# Patient Record
Sex: FEMALE | Race: BLACK OR AFRICAN AMERICAN | NOT HISPANIC OR LATINO | URBAN - METROPOLITAN AREA
[De-identification: names, ages, dates, MRNs, and addresses within clinical notes are randomized per-mention and may not be internally consistent; named-entity substitution may affect disease eponyms.]

---

## 2019-10-09 ENCOUNTER — INPATIENT (INPATIENT)
Facility: HOSPITAL | Age: 59
LOS: 11 days | Discharge: NURSING, REHAB OR LONG-TERM CARE | DRG: 54 | End: 2019-10-21
Attending: NEUROLOGICAL SURGERY | Admitting: NEUROLOGICAL SURGERY
Payer: COMMERCIAL

## 2019-10-09 VITALS
DIASTOLIC BLOOD PRESSURE: 80 MMHG | SYSTOLIC BLOOD PRESSURE: 156 MMHG | OXYGEN SATURATION: 93 % | HEART RATE: 123 BPM | RESPIRATION RATE: 22 BRPM | TEMPERATURE: 100 F

## 2019-10-09 LAB
ALBUMIN SERPL ELPH-MCNC: 4 G/DL — SIGNIFICANT CHANGE UP (ref 3.3–5)
ALP SERPL-CCNC: 69 U/L — SIGNIFICANT CHANGE UP (ref 40–120)
ALT FLD-CCNC: 12 U/L — SIGNIFICANT CHANGE UP (ref 10–45)
ANION GAP SERPL CALC-SCNC: 12 MMOL/L — SIGNIFICANT CHANGE UP (ref 5–17)
AST SERPL-CCNC: 11 U/L — SIGNIFICANT CHANGE UP (ref 10–40)
BASOPHILS # BLD AUTO: 0.02 K/UL — SIGNIFICANT CHANGE UP (ref 0–0.2)
BASOPHILS NFR BLD AUTO: 0.2 % — SIGNIFICANT CHANGE UP (ref 0–2)
BILIRUB SERPL-MCNC: 0.5 MG/DL — SIGNIFICANT CHANGE UP (ref 0.2–1.2)
BUN SERPL-MCNC: 8 MG/DL — SIGNIFICANT CHANGE UP (ref 7–23)
CALCIUM SERPL-MCNC: 8.9 MG/DL — SIGNIFICANT CHANGE UP (ref 8.4–10.5)
CHLORIDE SERPL-SCNC: 109 MMOL/L — HIGH (ref 96–108)
CO2 SERPL-SCNC: 23 MMOL/L — SIGNIFICANT CHANGE UP (ref 22–31)
CREAT SERPL-MCNC: 0.67 MG/DL — SIGNIFICANT CHANGE UP (ref 0.5–1.3)
EOSINOPHIL # BLD AUTO: 0.01 K/UL — SIGNIFICANT CHANGE UP (ref 0–0.5)
EOSINOPHIL NFR BLD AUTO: 0.1 % — SIGNIFICANT CHANGE UP (ref 0–6)
GAS PNL BLDV: SIGNIFICANT CHANGE UP
GLUCOSE SERPL-MCNC: 195 MG/DL — HIGH (ref 70–99)
HCT VFR BLD CALC: 47.7 % — HIGH (ref 34.5–45)
HGB BLD-MCNC: 16.6 G/DL — HIGH (ref 11.5–15.5)
IMM GRANULOCYTES NFR BLD AUTO: 0.4 % — SIGNIFICANT CHANGE UP (ref 0–1.5)
LYMPHOCYTES # BLD AUTO: 0.94 K/UL — LOW (ref 1–3.3)
LYMPHOCYTES # BLD AUTO: 11.2 % — LOW (ref 13–44)
MCHC RBC-ENTMCNC: 30.3 PG — SIGNIFICANT CHANGE UP (ref 27–34)
MCHC RBC-ENTMCNC: 34.8 GM/DL — SIGNIFICANT CHANGE UP (ref 32–36)
MCV RBC AUTO: 87.2 FL — SIGNIFICANT CHANGE UP (ref 80–100)
MONOCYTES # BLD AUTO: 0.32 K/UL — SIGNIFICANT CHANGE UP (ref 0–0.9)
MONOCYTES NFR BLD AUTO: 3.8 % — SIGNIFICANT CHANGE UP (ref 2–14)
NEUTROPHILS # BLD AUTO: 7.04 K/UL — SIGNIFICANT CHANGE UP (ref 1.8–7.4)
NEUTROPHILS NFR BLD AUTO: 84.3 % — HIGH (ref 43–77)
NRBC # BLD: 0 /100 WBCS — SIGNIFICANT CHANGE UP (ref 0–0)
PLATELET # BLD AUTO: 203 K/UL — SIGNIFICANT CHANGE UP (ref 150–400)
POTASSIUM SERPL-MCNC: 4.5 MMOL/L — SIGNIFICANT CHANGE UP (ref 3.5–5.3)
POTASSIUM SERPL-SCNC: 4.5 MMOL/L — SIGNIFICANT CHANGE UP (ref 3.5–5.3)
PROT SERPL-MCNC: 7.3 G/DL — SIGNIFICANT CHANGE UP (ref 6–8.3)
RBC # BLD: 5.47 M/UL — HIGH (ref 3.8–5.2)
RBC # FLD: 13.2 % — SIGNIFICANT CHANGE UP (ref 10.3–14.5)
SODIUM SERPL-SCNC: 144 MMOL/L — SIGNIFICANT CHANGE UP (ref 135–145)
WBC # BLD: 8.36 K/UL — SIGNIFICANT CHANGE UP (ref 3.8–10.5)
WBC # FLD AUTO: 8.36 K/UL — SIGNIFICANT CHANGE UP (ref 3.8–10.5)

## 2019-10-09 PROCEDURE — 93010 ELECTROCARDIOGRAM REPORT: CPT

## 2019-10-09 PROCEDURE — 99291 CRITICAL CARE FIRST HOUR: CPT

## 2019-10-09 PROCEDURE — 71045 X-RAY EXAM CHEST 1 VIEW: CPT | Mod: 26

## 2019-10-09 RX ORDER — PIPERACILLIN AND TAZOBACTAM 4; .5 G/20ML; G/20ML
3.38 INJECTION, POWDER, LYOPHILIZED, FOR SOLUTION INTRAVENOUS ONCE
Refills: 0 | Status: COMPLETED | OUTPATIENT
Start: 2019-10-09 | End: 2019-10-09

## 2019-10-09 RX ORDER — SODIUM CHLORIDE 9 MG/ML
2000 INJECTION, SOLUTION INTRAVENOUS ONCE
Refills: 0 | Status: COMPLETED | OUTPATIENT
Start: 2019-10-09 | End: 2019-10-09

## 2019-10-09 RX ORDER — ACETAMINOPHEN 500 MG
650 TABLET ORAL ONCE
Refills: 0 | Status: COMPLETED | OUTPATIENT
Start: 2019-10-09 | End: 2019-10-09

## 2019-10-09 RX ORDER — VANCOMYCIN HCL 1 G
1000 VIAL (EA) INTRAVENOUS ONCE
Refills: 0 | Status: COMPLETED | OUTPATIENT
Start: 2019-10-09 | End: 2019-10-09

## 2019-10-09 RX ADMIN — PIPERACILLIN AND TAZOBACTAM 200 GRAM(S): 4; .5 INJECTION, POWDER, LYOPHILIZED, FOR SOLUTION INTRAVENOUS at 23:11

## 2019-10-09 RX ADMIN — SODIUM CHLORIDE 2000 MILLILITER(S): 9 INJECTION, SOLUTION INTRAVENOUS at 23:20

## 2019-10-09 RX ADMIN — PIPERACILLIN AND TAZOBACTAM 3.38 GRAM(S): 4; .5 INJECTION, POWDER, LYOPHILIZED, FOR SOLUTION INTRAVENOUS at 23:51

## 2019-10-09 RX ADMIN — Medication 650 MILLIGRAM(S): at 23:11

## 2019-10-09 RX ADMIN — Medication 250 MILLIGRAM(S): at 23:51

## 2019-10-09 NOTE — ED ADULT NURSE NOTE - NSIMPLEMENTINTERV_GEN_ALL_ED
Implemented All Fall Risk Interventions:  [place] to call system. Call bell, personal items and telephone within reach. Instruct patient to call for assistance. Room bathroom lighting operational. Non-slip footwear when patient is off stretcher. Physically safe environment: no spills, clutter or unnecessary equipment. Stretcher in lowest position, wheels locked, appropriate side rails in place. Provide visual cue, wrist band, yellow gown, etc. Monitor gait and stability. Monitor for mental status changes and reorient to person, place, and time. Review medications for side effects contributing to fall risk. Reinforce activity limits and safety measures with patient and family.

## 2019-10-09 NOTE — ED PROVIDER NOTE - OBJECTIVE STATEMENT
59 Y F transferred from [place] for CT showing shift and vasogenic edema, and meningioma. Per tx paperwork pt is from NH has hx of CVA and HTN was sent in to OSH today for AMS. Per documentation unclear what her baseline MS is. At OSH pt had CTH done that showed exta-axial masses, likely meningiomas, there is mass effect on the left ventricle with 1.3 cm left to right midline shift. She was given Keppra 100 mh, decadron 12mg, was agitated, got haldol 5mg and plan was to do MRI however tx here for neurosurgery. Remainder of hx limited 2/2 patients MS and no family here at the moment.

## 2019-10-09 NOTE — ED ADULT NURSE NOTE - OBJECTIVE STATEMENT
60 YO female PMHx DM, HTN, HLD, transfer from [facility] for AMS and possible meningioma with 3mm shift. Patient lethargic as patient received 5 of haldol from transferring facility. As per EMS, patient was initially brought to [facility] ED for AMS. Head CT revealed possible mass with shift, transferred for neuro consult. As per EMS, patient was combative at [facility], given haldol and keppra for agitation. Patient currently A&OX2, disorientated to time, PERRL, equal strength and senstion b/l. denies chest pain, SOB, HA, N/V/D, abdominal pain, fever/chills, urinary symptoms, hematuria, weakness, dizziness, numbness, tinging. Peripheral pulses present b/l. Skin warm, dry and pink. Pt placed in position of comfort. Pt educated on call bell system and provided call bell. Bed in lowest position, wheels locked, appropriate side rails raised. Pt denies needs at this time.

## 2019-10-09 NOTE — ED PROVIDER NOTE - ATTENDING CONTRIBUTION TO CARE
Agree with above, Fredy Carballo MD, FACEP   Based on patient's history and physical exam, as well as the results of today's workup, I feel that patient warrants admission to the hospital for further workup/evaluation and continued management. I discussed the findings of today's workup with the patient and addressed the patient's questions and concerns. The patient was agreeable with admission. Our team spoke with the inpatient receiving team who accepted the patient for admission and subsequently took over the patient's care at the time of admission.

## 2019-10-09 NOTE — ED PROVIDER NOTE - CLINICAL SUMMARY MEDICAL DECISION MAKING FREE TEXT BOX
59 Y F went to OSH for ? AMS and was found to have vasogenic edema on CT scan with some midline shift, found to be febrile and tachycardic here. Will get full sepsis workup, neurosurgery to see pt. At this time she is protecting her airway and coughing feel this could be pneumonia however urine also appears infected, will send both. Pt will need admission posible further imaging her NSX and chest AP if no source on initial workup.

## 2019-10-10 DIAGNOSIS — R09.89 OTHER SPECIFIED SYMPTOMS AND SIGNS INVOLVING THE CIRCULATORY AND RESPIRATORY SYSTEMS: ICD-10-CM

## 2019-10-10 DIAGNOSIS — R41.82 ALTERED MENTAL STATUS, UNSPECIFIED: ICD-10-CM

## 2019-10-10 LAB
APPEARANCE UR: SIGNIFICANT CHANGE UP
APTT BLD: 23.6 SEC — LOW (ref 27.5–36.3)
BACTERIA # UR AUTO: NEGATIVE — SIGNIFICANT CHANGE UP
BILIRUB UR-MCNC: NEGATIVE — SIGNIFICANT CHANGE UP
BLD GP AB SCN SERPL QL: NEGATIVE — SIGNIFICANT CHANGE UP
BLD GP AB SCN SERPL QL: NEGATIVE — SIGNIFICANT CHANGE UP
COLOR SPEC: YELLOW — SIGNIFICANT CHANGE UP
CULTURE RESULTS: SIGNIFICANT CHANGE UP
DIFF PNL FLD: ABNORMAL
EPI CELLS # UR: SIGNIFICANT CHANGE UP
GLUCOSE BLDC GLUCOMTR-MCNC: 121 MG/DL — HIGH (ref 70–99)
GLUCOSE BLDC GLUCOMTR-MCNC: 136 MG/DL — HIGH (ref 70–99)
GLUCOSE BLDC GLUCOMTR-MCNC: 161 MG/DL — HIGH (ref 70–99)
GLUCOSE BLDC GLUCOMTR-MCNC: 214 MG/DL — HIGH (ref 70–99)
GLUCOSE UR QL: ABNORMAL
HCG SERPL-ACNC: <2 MIU/ML — SIGNIFICANT CHANGE UP
HCT VFR BLD CALC: 48.1 % — HIGH (ref 34.5–45)
HGB BLD-MCNC: 16.4 G/DL — HIGH (ref 11.5–15.5)
INR BLD: 1.05 RATIO — SIGNIFICANT CHANGE UP (ref 0.88–1.16)
KETONES UR-MCNC: ABNORMAL
LEUKOCYTE ESTERASE UR-ACNC: NEGATIVE — SIGNIFICANT CHANGE UP
MCHC RBC-ENTMCNC: 29.5 PG — SIGNIFICANT CHANGE UP (ref 27–34)
MCHC RBC-ENTMCNC: 34.1 GM/DL — SIGNIFICANT CHANGE UP (ref 32–36)
MCV RBC AUTO: 86.5 FL — SIGNIFICANT CHANGE UP (ref 80–100)
NITRITE UR-MCNC: NEGATIVE — SIGNIFICANT CHANGE UP
NRBC # BLD: 0 /100 WBCS — SIGNIFICANT CHANGE UP (ref 0–0)
PH UR: 7 — SIGNIFICANT CHANGE UP (ref 5–8)
PLATELET # BLD AUTO: 208 K/UL — SIGNIFICANT CHANGE UP (ref 150–400)
PROT UR-MCNC: ABNORMAL
PROTHROM AB SERPL-ACNC: 12 SEC — SIGNIFICANT CHANGE UP (ref 10–12.9)
RAPID RVP RESULT: SIGNIFICANT CHANGE UP
RBC # BLD: 5.56 M/UL — HIGH (ref 3.8–5.2)
RBC # FLD: 13 % — SIGNIFICANT CHANGE UP (ref 10.3–14.5)
RBC CASTS # UR COMP ASSIST: >50 /HPF — SIGNIFICANT CHANGE UP (ref 0–4)
RH IG SCN BLD-IMP: POSITIVE — SIGNIFICANT CHANGE UP
RH IG SCN BLD-IMP: POSITIVE — SIGNIFICANT CHANGE UP
SP GR SPEC: 1.02 — SIGNIFICANT CHANGE UP (ref 1.01–1.02)
SPECIMEN SOURCE: SIGNIFICANT CHANGE UP
UROBILINOGEN FLD QL: NEGATIVE — SIGNIFICANT CHANGE UP
WBC # BLD: 8.38 K/UL — SIGNIFICANT CHANGE UP (ref 3.8–10.5)
WBC # FLD AUTO: 8.38 K/UL — SIGNIFICANT CHANGE UP (ref 3.8–10.5)
WBC UR QL: SIGNIFICANT CHANGE UP

## 2019-10-10 PROCEDURE — 99292 CRITICAL CARE ADDL 30 MIN: CPT

## 2019-10-10 PROCEDURE — 99291 CRITICAL CARE FIRST HOUR: CPT

## 2019-10-10 PROCEDURE — 93970 EXTREMITY STUDY: CPT | Mod: 26

## 2019-10-10 PROCEDURE — 93306 TTE W/DOPPLER COMPLETE: CPT | Mod: 26

## 2019-10-10 PROCEDURE — 70450 CT HEAD/BRAIN W/O DYE: CPT | Mod: 26

## 2019-10-10 RX ORDER — VALPROIC ACID (AS SODIUM SALT) 250 MG/5ML
250 SOLUTION, ORAL ORAL EVERY 8 HOURS
Refills: 0 | Status: DISCONTINUED | OUTPATIENT
Start: 2019-10-10 | End: 2019-10-10

## 2019-10-10 RX ORDER — SENNA PLUS 8.6 MG/1
2 TABLET ORAL AT BEDTIME
Refills: 0 | Status: DISCONTINUED | OUTPATIENT
Start: 2019-10-10 | End: 2019-10-21

## 2019-10-10 RX ORDER — FAMOTIDINE 10 MG/ML
20 INJECTION INTRAVENOUS
Refills: 0 | Status: DISCONTINUED | OUTPATIENT
Start: 2019-10-10 | End: 2019-10-21

## 2019-10-10 RX ORDER — DEXAMETHASONE 0.5 MG/5ML
4 ELIXIR ORAL EVERY 6 HOURS
Refills: 0 | Status: DISCONTINUED | OUTPATIENT
Start: 2019-10-10 | End: 2019-10-10

## 2019-10-10 RX ORDER — INSULIN LISPRO 100/ML
VIAL (ML) SUBCUTANEOUS EVERY 6 HOURS
Refills: 0 | Status: DISCONTINUED | OUTPATIENT
Start: 2019-10-10 | End: 2019-10-14

## 2019-10-10 RX ORDER — AMLODIPINE BESYLATE 2.5 MG/1
10 TABLET ORAL DAILY
Refills: 0 | Status: DISCONTINUED | OUTPATIENT
Start: 2019-10-10 | End: 2019-10-21

## 2019-10-10 RX ORDER — SODIUM CHLORIDE 9 MG/ML
250 INJECTION INTRAMUSCULAR; INTRAVENOUS; SUBCUTANEOUS ONCE
Refills: 0 | Status: COMPLETED | OUTPATIENT
Start: 2019-10-10 | End: 2019-10-10

## 2019-10-10 RX ORDER — DOCUSATE SODIUM 100 MG
100 CAPSULE ORAL THREE TIMES A DAY
Refills: 0 | Status: DISCONTINUED | OUTPATIENT
Start: 2019-10-10 | End: 2019-10-21

## 2019-10-10 RX ORDER — FAMOTIDINE 10 MG/ML
20 INJECTION INTRAVENOUS DAILY
Refills: 0 | Status: DISCONTINUED | OUTPATIENT
Start: 2019-10-10 | End: 2019-10-10

## 2019-10-10 RX ORDER — DEXAMETHASONE 0.5 MG/5ML
4 ELIXIR ORAL EVERY 8 HOURS
Refills: 0 | Status: DISCONTINUED | OUTPATIENT
Start: 2019-10-10 | End: 2019-10-16

## 2019-10-10 RX ORDER — CHLORHEXIDINE GLUCONATE 213 G/1000ML
1 SOLUTION TOPICAL
Refills: 0 | Status: DISCONTINUED | OUTPATIENT
Start: 2019-10-10 | End: 2019-10-13

## 2019-10-10 RX ORDER — HALOPERIDOL DECANOATE 100 MG/ML
5 INJECTION INTRAMUSCULAR ONCE
Refills: 0 | Status: COMPLETED | OUTPATIENT
Start: 2019-10-10 | End: 2019-10-10

## 2019-10-10 RX ORDER — VALPROIC ACID (AS SODIUM SALT) 250 MG/5ML
500 SOLUTION, ORAL ORAL EVERY 8 HOURS
Refills: 0 | Status: DISCONTINUED | OUTPATIENT
Start: 2019-10-10 | End: 2019-10-13

## 2019-10-10 RX ORDER — ALBUTEROL 90 UG/1
2 AEROSOL, METERED ORAL EVERY 6 HOURS
Refills: 0 | Status: DISCONTINUED | OUTPATIENT
Start: 2019-10-10 | End: 2019-10-21

## 2019-10-10 RX ORDER — ENOXAPARIN SODIUM 100 MG/ML
40 INJECTION SUBCUTANEOUS DAILY
Refills: 0 | Status: DISCONTINUED | OUTPATIENT
Start: 2019-10-10 | End: 2019-10-18

## 2019-10-10 RX ORDER — ACETAMINOPHEN 500 MG
1000 TABLET ORAL ONCE
Refills: 0 | Status: COMPLETED | OUTPATIENT
Start: 2019-10-10 | End: 2019-10-10

## 2019-10-10 RX ORDER — MONTELUKAST 4 MG/1
10 TABLET, CHEWABLE ORAL DAILY
Refills: 0 | Status: DISCONTINUED | OUTPATIENT
Start: 2019-10-10 | End: 2019-10-21

## 2019-10-10 RX ORDER — TRAMADOL HYDROCHLORIDE 50 MG/1
25 TABLET ORAL ONCE
Refills: 0 | Status: DISCONTINUED | OUTPATIENT
Start: 2019-10-10 | End: 2019-10-10

## 2019-10-10 RX ORDER — SIMVASTATIN 20 MG/1
40 TABLET, FILM COATED ORAL AT BEDTIME
Refills: 0 | Status: DISCONTINUED | OUTPATIENT
Start: 2019-10-10 | End: 2019-10-21

## 2019-10-10 RX ORDER — SODIUM CHLORIDE 9 MG/ML
1000 INJECTION INTRAMUSCULAR; INTRAVENOUS; SUBCUTANEOUS
Refills: 0 | Status: DISCONTINUED | OUTPATIENT
Start: 2019-10-10 | End: 2019-10-11

## 2019-10-10 RX ORDER — METOPROLOL TARTRATE 50 MG
5 TABLET ORAL ONCE
Refills: 0 | Status: COMPLETED | OUTPATIENT
Start: 2019-10-10 | End: 2019-10-10

## 2019-10-10 RX ORDER — SODIUM CHLORIDE 9 MG/ML
1000 INJECTION INTRAMUSCULAR; INTRAVENOUS; SUBCUTANEOUS ONCE
Refills: 0 | Status: COMPLETED | OUTPATIENT
Start: 2019-10-10 | End: 2019-10-10

## 2019-10-10 RX ORDER — SODIUM CHLORIDE 9 MG/ML
500 INJECTION INTRAMUSCULAR; INTRAVENOUS; SUBCUTANEOUS ONCE
Refills: 0 | Status: COMPLETED | OUTPATIENT
Start: 2019-10-10 | End: 2019-10-10

## 2019-10-10 RX ADMIN — Medication 4 MILLIGRAM(S): at 13:08

## 2019-10-10 RX ADMIN — Medication 4: at 18:47

## 2019-10-10 RX ADMIN — SODIUM CHLORIDE 75 MILLILITER(S): 9 INJECTION INTRAMUSCULAR; INTRAVENOUS; SUBCUTANEOUS at 05:56

## 2019-10-10 RX ADMIN — Medication 5 MILLIGRAM(S): at 16:00

## 2019-10-10 RX ADMIN — SODIUM CHLORIDE 1000 MILLILITER(S): 9 INJECTION INTRAMUSCULAR; INTRAVENOUS; SUBCUTANEOUS at 19:30

## 2019-10-10 RX ADMIN — HALOPERIDOL DECANOATE 5 MILLIGRAM(S): 100 INJECTION INTRAMUSCULAR at 01:34

## 2019-10-10 RX ADMIN — Medication 400 MILLIGRAM(S): at 15:00

## 2019-10-10 RX ADMIN — SODIUM CHLORIDE 2000 MILLILITER(S): 9 INJECTION INTRAMUSCULAR; INTRAVENOUS; SUBCUTANEOUS at 14:00

## 2019-10-10 RX ADMIN — Medication 27.5 MILLIGRAM(S): at 13:20

## 2019-10-10 RX ADMIN — Medication 1000 MILLIGRAM(S): at 15:15

## 2019-10-10 RX ADMIN — SODIUM CHLORIDE 75 MILLILITER(S): 9 INJECTION INTRAMUSCULAR; INTRAVENOUS; SUBCUTANEOUS at 18:42

## 2019-10-10 RX ADMIN — SENNA PLUS 2 TABLET(S): 8.6 TABLET ORAL at 22:28

## 2019-10-10 RX ADMIN — SIMVASTATIN 40 MILLIGRAM(S): 20 TABLET, FILM COATED ORAL at 22:28

## 2019-10-10 RX ADMIN — Medication 25 MILLIGRAM(S): at 05:56

## 2019-10-10 RX ADMIN — CHLORHEXIDINE GLUCONATE 1 APPLICATION(S): 213 SOLUTION TOPICAL at 22:27

## 2019-10-10 RX ADMIN — TRAMADOL HYDROCHLORIDE 25 MILLIGRAM(S): 50 TABLET ORAL at 22:30

## 2019-10-10 RX ADMIN — Medication 4 MILLIGRAM(S): at 22:28

## 2019-10-10 RX ADMIN — Medication 100 MILLIGRAM(S): at 13:07

## 2019-10-10 RX ADMIN — ENOXAPARIN SODIUM 40 MILLIGRAM(S): 100 INJECTION SUBCUTANEOUS at 18:41

## 2019-10-10 RX ADMIN — MONTELUKAST 10 MILLIGRAM(S): 4 TABLET, CHEWABLE ORAL at 13:08

## 2019-10-10 RX ADMIN — TRAMADOL HYDROCHLORIDE 25 MILLIGRAM(S): 50 TABLET ORAL at 23:00

## 2019-10-10 RX ADMIN — Medication 27.5 MILLIGRAM(S): at 22:28

## 2019-10-10 RX ADMIN — FAMOTIDINE 20 MILLIGRAM(S): 10 INJECTION INTRAVENOUS at 18:41

## 2019-10-10 RX ADMIN — Medication 100 MILLIGRAM(S): at 22:28

## 2019-10-10 RX ADMIN — SODIUM CHLORIDE 1000 MILLILITER(S): 9 INJECTION INTRAMUSCULAR; INTRAVENOUS; SUBCUTANEOUS at 05:57

## 2019-10-10 NOTE — DIETITIAN INITIAL EVALUATION ADULT. - REASON INDICATOR FOR ASSESSMENT
Nutrition Assessment warranted for length of stay.  Information obtained from: RN, comprehensive chart review, interdisciplinary medical rounds

## 2019-10-10 NOTE — PROGRESS NOTE ADULT - SUBJECTIVE AND OBJECTIVE BOX
Chief complaint:   Patient is a 59y old  Female who presents with a chief complaint of brain mass (10 Oct 2019 06:44)    HPI:  59 Y F transferred from [place] for CT showing shift and vasogenic edema, and meningioma. Per tx paperwork pt is from NH has hx of CVA and HTN was sent in to OSH today for AMS. Per documentation unclear what her baseline MS is. At OSH pt had CTH done that showed exta-axial masses, likely meningiomas, there is mass effect on the left ventricle with 1.3 cm left to right midline shift. She was given Keppra 100 mh, decadron 12mg, was agitated, got haldol 5mg and plan was to do MRI however tx here for neurosurgery. Remainder of hx limited 2/2 patients MS and no family here at the moment (10 Oct 2019 01:22)      Stay Summary:      OVERNIGHT EVENTS:      ROS: [ ]  Unable to assess due to mental status   All other systems negative    -----------------------------------------------------------------------------------------------------------------------------------------------------------------------------------  ICU Vital Signs Last 24 Hrs  T(C): 36.8 (10 Oct 2019 03:00), Max: 38 (09 Oct 2019 23:24)  T(F): 98.2 (10 Oct 2019 03:00), Max: 100.4 (09 Oct 2019 23:24)  HR: 121 (10 Oct 2019 06:00) (114 - 123)  BP: 155/95 (10 Oct 2019 06:00) (142/84 - 158/95)  BP(mean): 113 (10 Oct 2019 06:00) (100 - 113)  ABP: --  ABP(mean): --  RR: 20 (10 Oct 2019 06:00) (17 - 28)  SpO2: 96% (10 Oct 2019 06:00) (93% - 97%)      I&O's Summary    09 Oct 2019 07:01  -  10 Oct 2019 07:00  --------------------------------------------------------  IN: 840 mL / OUT: 1225 mL / NET: -385 mL        MEDICATIONS  (STANDING):  amLODIPine   Tablet 10 milliGRAM(s) Oral daily  chlorhexidine 4% Liquid 1 Application(s) Topical <User Schedule>  dexamethasone  Injectable 4 milliGRAM(s) IV Push every 6 hours  docusate sodium 100 milliGRAM(s) Oral three times a day  enalapril 10 milliGRAM(s) Oral daily  famotidine    Tablet 20 milliGRAM(s) Oral two times a day  insulin lispro (HumaLOG) corrective regimen sliding scale   SubCutaneous every 6 hours  montelukast 10 milliGRAM(s) Oral daily  senna 2 Tablet(s) Oral at bedtime  simvastatin 40 milliGRAM(s) Oral at bedtime  sodium chloride 0.9%. 1000 milliLiter(s) (75 mL/Hr) IV Continuous <Continuous>  valproate sodium IVPB 250 milliGRAM(s) IV Intermittent every 8 hours      RESPIRATORY:        NEUROIMAGING:   Recent imaging studies were reviewed.    LAB RESULTS:                          16.6   8.36  )-----------( 203      ( 09 Oct 2019 23:24 )             47.7       PT/INR - ( 09 Oct 2019 23:24 )   PT: 12.0 sec;   INR: 1.05 ratio         PTT - ( 09 Oct 2019 23:24 )  PTT:23.6 sec    10-09    144  |  109<H>  |  8   ----------------------------<  195<H>  4.5   |  23  |  0.67    Ca    8.9      09 Oct 2019 23:24    TPro  7.3  /  Alb  4.0  /  TBili  0.5  /  DBili  x   /  AST  11  /  ALT  12  /  AlkPhos  69  10-09    -----------------------------------------------------------------------------------------------------------------------------------------------------------------------------------    PHYSICAL EXAM:  General: Calm, intubated  HEENT: MMM  Neuro:  -Mental status- No acute distress  -CN- PERRL 3mm, EOMI, tongue midline, face symmetric  + corneals/cough/gag  -Motor-  -Sensation-   -Coordination- unable to assess    CV: RRR  Pulm: Clear to auscultation  Abd: Soft, nontender, nondistended  Ext: No edema  Skin: warm, dry Chief complaint:   Patient is a 59y old  Female who presents with a chief complaint of brain mass (10 Oct 2019 06:44)    HPI:  59 Y F transferred from [place] for CT showing shift and vasogenic edema, and meningioma. Per tx paperwork pt is from NH has hx of CVA and HTN was sent in to OSH today for AMS. Per documentation unclear what her baseline MS is. At OSH pt had CTH done that showed exta-axial masses, likely meningiomas, there is mass effect on the left ventricle with 1.3 cm left to right midline shift. She was given Keppra 100 mh, decadron 12mg, was agitated, got haldol 5mg and plan was to do MRI however tx here for neurosurgery. Remainder of hx limited 2/2 patients MS and no family here at the moment (10 Oct 2019 01:22)      ROS: No pain  All other systems negative    -----------------------------------------------------------------------------------------------------------------------------------------------------------------------------------  ICU Vital Signs Last 24 Hrs  T(C): 36.8 (10 Oct 2019 03:00), Max: 38 (09 Oct 2019 23:24)  T(F): 98.2 (10 Oct 2019 03:00), Max: 100.4 (09 Oct 2019 23:24)  HR: 121 (10 Oct 2019 06:00) (114 - 123)  BP: 155/95 (10 Oct 2019 06:00) (142/84 - 158/95)  BP(mean): 113 (10 Oct 2019 06:00) (100 - 113)  ABP: --  ABP(mean): --  RR: 20 (10 Oct 2019 06:00) (17 - 28)  SpO2: 96% (10 Oct 2019 06:00) (93% - 97%)      I&O's Summary    09 Oct 2019 07:01  -  10 Oct 2019 07:00  --------------------------------------------------------  IN: 840 mL / OUT: 1225 mL / NET: -385 mL        MEDICATIONS  (STANDING):  amLODIPine   Tablet 10 milliGRAM(s) Oral daily  chlorhexidine 4% Liquid 1 Application(s) Topical <User Schedule>  dexamethasone  Injectable 4 milliGRAM(s) IV Push every 6 hours  docusate sodium 100 milliGRAM(s) Oral three times a day  enalapril 10 milliGRAM(s) Oral daily  famotidine    Tablet 20 milliGRAM(s) Oral two times a day  insulin lispro (HumaLOG) corrective regimen sliding scale   SubCutaneous every 6 hours  montelukast 10 milliGRAM(s) Oral daily  senna 2 Tablet(s) Oral at bedtime  simvastatin 40 milliGRAM(s) Oral at bedtime  sodium chloride 0.9%. 1000 milliLiter(s) (75 mL/Hr) IV Continuous <Continuous>  valproate sodium IVPB 250 milliGRAM(s) IV Intermittent every 8 hours    NEUROIMAGING:   Recent imaging studies were reviewed.    LAB RESULTS:                          16.6   8.36  )-----------( 203      ( 09 Oct 2019 23:24 )             47.7       PT/INR - ( 09 Oct 2019 23:24 )   PT: 12.0 sec;   INR: 1.05 ratio         PTT - ( 09 Oct 2019 23:24 )  PTT:23.6 sec    10-09    144  |  109<H>  |  8   ----------------------------<  195<H>  4.5   |  23  |  0.67    Ca    8.9      09 Oct 2019 23:24    TPro  7.3  /  Alb  4.0  /  TBili  0.5  /  DBili  x   /  AST  11  /  ALT  12  /  AlkPhos  69  10-09    -----------------------------------------------------------------------------------------------------------------------------------------------------------------------------------    PHYSICAL EXAM:  General: Calm but fluctuating mental status; keeping eyes closed, impulsive  HEENT: MMM  Neuro:  -Mental status- No acute distress, AOx3, following commands  -CN- PERRL 3mm, EOMI, tongue midline, face symmetric  -Motor- Full strength throughout, no drift  -Sensation- intact to light touch  -Coordination- no dysmetria    CV: RRR, tachy  Pulm: Clear to auscultation  Abd: Soft, nontender, nondistended  Ext: No edema  Skin: warm, dry

## 2019-10-10 NOTE — PROGRESS NOTE ADULT - ASSESSMENT
ASSESSMENT/PLAN:    NEURO:  Activity: [] mobilize as tolerated [] Bedrest [] PT [] OT [] PMNR    PULM:    CV:  SBP goal    RENAL:  Fluids:    GI:  Diet:  GI prophylaxis [] not indicated [] PPI [] other:  Bowel regimen [] colace [] senna [] other:    ENDO:   Goal euglycemia (-180)    HEME/ONC:  VTE prophylaxis: [] SCDs [] chemoprophylaxis [] high risk of DVT/PE on admission due to:    ID:    MISC:    SOCIAL/FAMILY:  [] awaiting [] updated at bedside [] family meeting    CODE STATUS:  [] Full Code [] DNR [] DNI [] Palliative/Comfort Care    DISPOSITION:  [] ICU [] Stroke Unit [] Floor [] EMU [] RCU [] PCU    [] Patient is at high risk of neurologic deterioration/death due to:     Time seen:  Time spent: ___ [] critical care minutes 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 with cerebral edema and brain compression     NEURO:  MRI when able, may need Anesthesia  Steroids for cerebral edema, though this may likely worsen mood  Seizure prophylaxis; suggest VPA for mood stabilizing properties  Delirium precautions  NSGY for operative planning  malignancy workup- CT chest abd/pelvis  Activity: [] mobilize as tolerated [x] Bedrest for tonight [] PT [] OT [] PMNR    PULM:  Aspiration precautions  SpO2>92    CV:  SBP goal 100-160mmHg    RENAL:  Fluids: IVF as NPO  voiding  replete lytes prn    GI:  Diet: NPO for imaging with anesthesia  Bowel regimen     ENDO:   Goal euglycemia (-180)  RISS as on steroids  send A1c    HEME/ONC:  CT Chest/Abdomen/Pelvis to r/o primary oncologic process  VTE prophylaxis: [x] SCDs [x] chemoprophylaxis    ID:  Fever w/u; f/u cultures    SOCIAL/FAMILY:  [] awaiting [x] updated at bedside [] family meeting    CODE STATUS:  [x] Full Code [] DNR [] DNI [] Palliative/Comfort Care    DISPOSITION:  [x] ICU [] Stroke Unit [] Floor [] EMU [] RCU [] PCU    [x] Patient is at high risk of neurologic deterioration/death due to: brain mass    Time seen:  Time spent: ___ [] critical care minutes

## 2019-10-10 NOTE — PROGRESS NOTE ADULT - ASSESSMENT
Brain masses  - MRI with anesthesia  - CT chest/abd/pelvis  - Steroids for cerebral edema  - Delirium precautions  - VPA for seizure prophylaxis and mood  - OR planning per NSGY

## 2019-10-10 NOTE — H&P ADULT - ASSESSMENT
Welfare, Judi  59F pmhx R CVA lives in nursing home unknown baseline transferred for AMS found to have multiple large likely meningiomas on head CT.   - Given unknown baseline and lethargy admitted to NSCU for q1h neurochecks  - MRI brain wwo synaptive protocol  - keppra, decadron if infectious etiology ruled out

## 2019-10-10 NOTE — ED ADULT NURSE REASSESSMENT NOTE - NS ED NURSE REASSESS COMMENT FT1
Patient removed RAC IV. Patient getting increasingly more agitated, using inappropriate language and violent with staff. MD Rubin made aware, will put in order for 5mg haldol

## 2019-10-10 NOTE — CHART NOTE - NSCHARTNOTEFT_GEN_A_CORE
CAPRINI SCORE [CLOT] Score on Admission for     AGE RELATED RISK FACTORS                                                       MOBILITY RELATED FACTORS  [X ] Age 41-60 years                                            (1 Point)                  [ ] Bed rest                                                        (1 Point)  [ ] Age: 61-74 years                                           (2 Points)                 [ ] Plaster cast                                                   (2 Points)  [ ] Age= 75 years                                              (3 Points)                 [ ] Bed bound for more than 72 hours                 (2 Points)    DISEASE RELATED RISK FACTORS                                               GENDER SPECIFIC FACTORS  [ ] Edema in the lower extremities                       (1 Point)                  [ ] Pregnancy                                                     (1 Point)  [ ] Varicose veins                                               (1 Point)                  [ ] Post-partum < 6 weeks                                   (1 Point)             [x ] BMI > 25 Kg/m2                                            (1 Point)                  [ ] Hormonal therapy  or oral contraception          (1 Point)                 [ ] Sepsis (in the previous month)                        (1 Point)                  [ ] History of pregnancy complications                 (1 point)  [ ] Pneumonia or serious lung disease                                               [ ] Unexplained or recurrent                     (1 Point)           (in the previous month)                               (1 Point)  [ ] Abnormal pulmonary function test                     (1 Point)                 SURGERY RELATED RISK FACTORS (include planned surgeries)  [ ] Acute myocardial infarction                              (1 Point)                 [ ]  Section                                             (1 Point)  [ ] Congestive heart failure (in the previous month)  (1 Point)         [ ] Minor surgery                                                  (1 Point)   [ ] Inflammatory bowel disease                             (1 Point)                 [ ] Arthroscopic surgery                                        (2 Points)  [ ] Central venous access                                      (2 Points)                [x ] General surgery lasting more than 45 minutes   (2 Points)       [ ] Stroke (in the previous month)                          (5 Points)               [ ] Elective arthroplasty                                         (5 Points)            [X ] current or past malignancy                              (2 Points)                                                                                                       HEMATOLOGY RELATED FACTORS                                                 TRAUMA RELATED RISK FACTORS  [ ] Prior episodes of VTE                                     (3 Points)                [ ] Fracture of the hip, pelvis, or leg                       (5 Points)  [ ] Positive family history for VTE                         (3 Points)                 [ ] Acute spinal cord injury (in the previous month)  (5 Points)  [ ] Prothrombin 69995 A                                     (3 Points)                 [ ] Paralysis  (less than 1 month)                             (5 Points)  [ ] Factor V Leiden                                             (3 Points)                  [ ] Multiple Trauma within 1 month                        (5 Points)  [ ] Lupus anticoagulants                                     (3 Points)                                                           [ ] Anticardiolipin antibodies                               (3 Points)                                                       [ ] High homocysteine in the blood                      (3 Points)                                             [ ] Other congenital or acquired thrombophilia      (3 Points)                                                [ ] Heparin induced thrombocytopenia                  (3 Points)                                          Total Score [  6        ]    Risk:  Very low 0   Low 1 to 2   Moderate 3 to 4   High =5       VTE Prophylaxis  Recommendations:  [ x] mechanical pneumatic compression devices                                      [ ] contraindicated: _____________________  [ ] chemo prophylaxis                                                                                   [x ] contraindicated ____Possible OR intervention _________________    **** HIGH LIKELIHOOD DVT PRESENT ON ADMISSION  [ x] (please order LE dopplers within 24 hours of admission)

## 2019-10-10 NOTE — DIETITIAN INITIAL EVALUATION ADULT. - ENERGY NEEDS
Ht:   Wt:   BMI:  kg/m2   IBW:  (+/-10%)     % IBW  Edema: none noted          Skin: no pressure injuries noted Ht: (stated) 60"  Wt: (UBW stated) 188 lbs  BMI: 36.7 kg/m2   IBW: 100 lbs (+/-10%)     188% IBW  Edema: none noted          Skin: no pressure injuries noted

## 2019-10-10 NOTE — H&P ADULT - NSICDXPASTMEDICALHX_GEN_ALL_CORE_FT
PAST MEDICAL HISTORY:  COPD with asthma     DM (diabetes mellitus)     H/O ischemic right MCA stroke     HTN (hypertension)

## 2019-10-10 NOTE — PROGRESS NOTE ADULT - SUBJECTIVE AND OBJECTIVE BOX
SUMMARY:   59 year-old -American woman transferred from [facility] for CT showing shift and vasogenic edema as well as multiple extra-axial masses, likely meningiomas versus calcified dural metastases, with 1.3cm leftward shift in setting of "altered mental status". It is currently unclear what her baseline mental status is but was notably agitated and uncooperative at [facility] and received haloperidol to calm her. She also received levetiracetam and dexamethasone prior to transfer to [facility]. Of note, patient with low grade fever in ED.    24 HOUR EVENTS:  Unable to tolerate CT Chest/Abdomen/Pelvis due to agitation. Admitted to NSCU.    VITALS/DATA/ORDERS: [x] Reviewed    EXAMINATION:  General: Irritable but no acute distress  HEENT: Right eyelid with small raised growth that appears to be subdermal without erythema  CV: Regular  Lungs: Good air entry  Abd: Soft, +BS  Ext: No edema  Neuro: Agitated, uncooperative, requires frequent coaxing to respond, eyes closed, only opens after multiple requests, eventually answered orientation questions (stated her name correctly, eventually acknowledged she was in the hospital but stated it was December), tangential at times, denied having "anything wrong with my brain! You all just want to make money," pupils small but reactive, face grossly symmetric, mild dysarthria, moves all limbs strongly and spontaneously but did not cooperate with confrontational testing

## 2019-10-10 NOTE — DIETITIAN INITIAL EVALUATION ADULT. - PHYSICAL APPEARANCE
Unable to conduct Nutrition Focused Physical Assessment. Per visual observation, pt with mild body fat depletion to orbitals.

## 2019-10-10 NOTE — H&P ADULT - HISTORY OF PRESENT ILLNESS
59 Y F transferred from [place] for CT showing shift and vasogenic edema, and meningioma. Per tx paperwork pt is from NH has hx of CVA and HTN was sent in to OSH today for AMS. Per documentation unclear what her baseline MS is. At OSH pt had CTH done that showed exta-axial masses, likely meningiomas, there is mass effect on the left ventricle with 1.3 cm left to right midline shift. She was given Keppra 100 mh, decadron 12mg, was agitated, got haldol 5mg and plan was to do MRI however tx here for neurosurgery. Remainder of hx limited 2/2 patients MS and no family here at the moment

## 2019-10-10 NOTE — H&P ADULT - NSHPPHYSICALEXAM_GEN_ALL_CORE
EO to voice but returns to somnolence, Oriented x1-2  Pupils 2mm and reactive b/l  FC, purposeful and anti gravity bilaterally, R > L

## 2019-10-10 NOTE — PROGRESS NOTE ADULT - ASSESSMENT
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 with cerebral edema and brain compression     NEURO: Neuro[place]chk q1H  CT head in AM for stability  MRI when able, may need Anesthesia  Steroids for cerebral edema, though this may likely worsen mood  Seizure prophylaxis; suggest VPA for mood stabilizing properties  Delirium precautions  NSGY for operative planning  Activity: [] mobilize as tolerated [x] Bedrest for tonight [] PT [] OT [] PMNR    PULM:  Aspiration precautions    CV:  SBP goal 100-160mmHg    RENAL:  Fluids: IVF as NPO    GI:  Diet: NPO for now  Bowel regimen     ENDO:   Goal euglycemia (-180)  RISS as on steroids    HEME/ONC:  CT Chest/Abdomen/Pelvis to r/o primary oncologic process  VTE prophylaxis: [x] SCDs [] chemoprophylaxis [x] hold chemoprophylaxis due to: possible OR [x] high risk of DVT/PE on admission due to: tumors  Screening dopplers ordered    ID:  Fever w/u; f/u cultures    SOCIAL/FAMILY:  [x] awaiting [] updated at bedside [] family meeting    CODE STATUS:  [x] Full Code [] DNR [] DNI [] Palliative/Comfort Care    DISPOSITION:  [x] ICU [] Stroke Unit [] Floor [] EMU [] RCU [] PCU    [x] Patient is at high risk of neurologic deterioration/death due to: brain compression    Time spent: 30 [x] critical care minutes    Contact: [phone] 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 with cerebral edema and brain compression     NEURO: Neurochechk q1H  CT head in AM for stability  MRI when able, may need Anesthesia  Steroids for cerebral edema, though this may likely worsen mood  Seizure prophylaxis; suggest VPA for mood stabilizing properties  Delirium precautions  NSGY for operative planning  Activity: [] mobilize as tolerated [x] Bedrest for tonight [] PT [] OT [] PMNR    PULM:  Aspiration precautions  ?Hx of Asthma - Contd on home meds of Montelukast 10 daily, and albuterol prn    CV:  SBP goal 100-160mmHg  Contd on amlodipine 10 daily, and Enalapril 20 daily  ?HLD - Contd on Simvastatin 40 qHS    RENAL:  Fluids: IVF as NPO    GI:  Diet: NPO for now  Bowel regimen     ENDO:   Goal euglycemia (-180)  RISS as on steroids    HEME/ONC:  CT Chest/Abdomen/Pelvis to r/o primary oncologic process  VTE prophylaxis: [x] SCDs [] chemoprophylaxis [x] hold chemoprophylaxis due to: possible OR [x] high risk of DVT/PE on admission due to: tumors  Screening dopplers ordered    ID:  Fever w/u; f/u cultures    SOCIAL/FAMILY:  [x] awaiting [] updated at bedside [] family meeting    CODE STATUS:  [x] Full Code [] DNR [] DNI [] Palliative/Comfort Care    DISPOSITION:  [x] ICU [] Stroke Unit [] Floor [] EMU [] RCU [] PCU    [x] Patient is at high risk of neurologic deterioration/death due to: brain compression    Time spent: 30 [x] critical care minutes    Contact: [phone]

## 2019-10-10 NOTE — DIETITIAN INITIAL EVALUATION ADULT. - ENERGY INTAKE
reportedly as per pt; has not yet received breakfast tray at time of RD visit; pending ? MRI./Good (>75%)

## 2019-10-10 NOTE — PROGRESS NOTE ADULT - SUBJECTIVE AND OBJECTIVE BOX
Calmer, but still intermittently irritable.     Fully oriented but at times confused follows with prompting, all limbs antigravity but likely full strength (does not cooperate with confrontational testing)

## 2019-10-10 NOTE — DIETITIAN INITIAL EVALUATION ADULT. - OTHER INFO
Pt is a "59 year-old -American woman transferred from [facility] for CT showing shift and vasogenic edema as well as multiple extra-axial masses, likely meningiomas versus calcified dural metastases, with 1.3cm leftward shift in setting of "altered mental status". Pt pending CT chest/abdomen/pelvis to r/o primary oncologic process. Pt is a "59 year-old -American woman transferred from [facility] for CT showing shift and vasogenic edema as well as multiple extra-axial masses, likely meningiomas versus calcified dural metastases, with 1.3cm leftward shift in setting of "altered mental status". Pt pending CT chest/abdomen/pelvis to r/o primary oncologic process.     INFORMATION PTA    ·Diet PTA: Pt observed resting in bed at time of RD visit. Appeared with slight confusion; repeatedly expressed "why am I here?". Per discussion with pt, reports good PO intake at baseline and consumes 3 meals daily. Pt denies specific food preferences; states she lives in assisted living facility which prepares her meals for her on daily basis. Pt denies intolerance to chewing/swallowing; denies prior history of N/V/C/diarrhea. Denies prior intake of daily vitamin/supplement. Denies food allergies.    ·Weight History PTA: Pt reports UBW ~188 lbs. Reports ? slight wt loss, however unable to quantify. Dosing wt not yet obtained at time of RD visit.     INFORMATION THIS ADMISSION    ·Last BM: No BM's recorded in-house at this time. On bowel regimen as ordered.     ·Other Subjective Information: Pt currently prescribed consistent carbohydrate diet, pureed consistency. A1c not yet obtained. Per RN, pt NPO this AM 2/2 pending ? MRI with anesthesia. Pt is a "59 year-old -American woman transferred from [facility] for CT showing shift and vasogenic edema as well as multiple extra-axial masses, likely meningiomas versus calcified dural metastases, with 1.3cm leftward shift in setting of "altered mental status". Pt pending CT chest/abdomen/pelvis to r/o primary oncologic process. PMH: DM, HTN, CVA (RN obtained via discussion with outside facility in which pt resides in).     INFORMATION PTA    ·Diet PTA: Pt observed resting in bed at time of RD visit. Appeared with slight confusion; repeatedly expressed "why am I here?". Per discussion with pt, reports good PO intake at baseline and consumes 3 meals daily. Pt denies specific food preferences; states she lives in assisted living facility which prepares her meals for her on daily basis. Pt denies intolerance to chewing/swallowing; denies prior history of N/V/C/diarrhea. Denies prior intake of daily vitamin/supplement. Denies food allergies.    ·Weight History PTA: Pt reports UBW ~188 lbs. Reports ? slight wt loss, however unable to quantify. Dosing wt not yet obtained at time of RD visit.     INFORMATION THIS ADMISSION    ·Last BM: No BM's recorded in-house at this time. On bowel regimen as ordered.     ·Other Subjective Information: Pt currently prescribed consistent carbohydrate diet, pureed consistency. A1c not yet obtained. Pt current pending ? MRI with anesthesia; has not yet received meal at this time.

## 2019-10-10 NOTE — ED ADULT NURSE NOTE - EENT WDL
Eyes with no visual disturbances.  Ears clean and dry and no hearing difficulties. Nose with pink mucosa and no drainage.  Mouth mucous membranes moist and pink.  No tenderness or swelling to throat or neck.
(0) No drift; limb holds 90 (or 45) degrees for full 10 secs

## 2019-10-10 NOTE — DIETITIAN INITIAL EVALUATION ADULT. - PERTINENT MEDS FT
Hibiclens, NaCl 0.9%, Humalog, Proventil, Amlodipine, Decadron, Colace, Vasotec, Pepcid, Singulair, Senna, Zocor, Depacon

## 2019-10-10 NOTE — PROGRESS NOTE ADULT - SUBJECTIVE AND OBJECTIVE BOX
SUMMARY:  59 year-old -American woman transferred from [facility] for CT showing shift and vasogenic edema as well as multiple extra-axial masses, likely meningiomas versus calcified dural metastases, with 1.3cm leftward shift in setting of "altered mental status". It is currently unclear what her baseline mental status is but was notably agitated and uncooperative at [facility] and received haloperidol to calm her. She also received levetiracetam and dexamethasone prior to transfer to [facility]. Of note, patient with low grade fever in ED.    Overnight Events: Admitted to NSICU.     ROS: Negative unless specified    VITALS:   T(C): 36.8 (10-10-19 @ 03:00), Max: 38 (10-09-19 @ 23:24)  HR: 121 (10-10-19 @ 06:00) (114 - 123)  BP: 155/95 (10-10-19 @ 06:00) (142/84 - 158/95)  RR: 20 (10-10-19 @ 06:00) (17 - 28)  SpO2: 96% (10-10-19 @ 06:00) (93% - 97%)    10-09-19 @ 07:01  -  10-10-19 @ 07:00  --------------------------------------------------------  IN: 750 mL / OUT: 975 mL / NET: -225 mL      LABS:                          16.6   8.36  )-----------( 203      ( 09 Oct 2019 23:24 )             47.7     10-09    144  |  109<H>  |  8   ----------------------------<  195<H>  4.5   |  23  |  0.67    Ca    8.9      09 Oct 2019 23:24    TPro  7.3  /  Alb  4.0  /  TBili  0.5  /  DBili  x   /  AST  11  /  ALT  12  /  AlkPhos  69  10-09    PT/INR - ( 09 Oct 2019 23:24 )   PT: 12.0 sec;   INR: 1.05 ratio         PTT - ( 09 Oct 2019 23:24 )  PTT:23.6 sec  MEDS:  MEDICATIONS  (STANDING):  amLODIPine   Tablet 10 milliGRAM(s) Oral daily  chlorhexidine 4% Liquid 1 Application(s) Topical <User Schedule>  dexamethasone  Injectable 4 milliGRAM(s) IV Push every 6 hours  docusate sodium 100 milliGRAM(s) Oral three times a day  enalapril 10 milliGRAM(s) Oral daily  famotidine    Tablet 20 milliGRAM(s) Oral daily  insulin lispro (HumaLOG) corrective regimen sliding scale   SubCutaneous every 6 hours  montelukast 10 milliGRAM(s) Oral daily  senna 2 Tablet(s) Oral at bedtime  simvastatin 40 milliGRAM(s) Oral at bedtime  sodium chloride 0.9%. 1000 milliLiter(s) (75 mL/Hr) IV Continuous <Continuous>  valproate sodium IVPB 250 milliGRAM(s) IV Intermittent every 8 hours    EXAMINATION:  General: Irritable but no acute distress  HEENT: Right eyelid with small raised growth that appears to be subdermal without erythema  CV: Regular  Lungs: Good air entry  Abd: Soft, +BS  Ext: No edema  Neuro: Agitated, uncooperative, requires frequent coaxing to respond, eyes closed, only opens after multiple requests, eventually answered orientation questions (stated her name correctly, eventually acknowledged she was in the hospital but stated it was December), tangential at times, denied having "anything wrong with my brain! You all just want to make money," pupils small but reactive, face grossly symmetric, mild dysarthria, moves all limbs strongly and spontaneously but did not cooperate with confrontational testing

## 2019-10-10 NOTE — PROGRESS NOTE ADULT - ASSESSMENT
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 with cerebral edema and brain compression     NEURO:  CT head in AM for stability  MRI when able, may need Anesthesia  Steroids for cerebral edema, though this may likely worsen mood  Seizure prophylaxis; suggest VPA for mood stabilizing properties  Delirium precautions  NSGY for operative planning  Activity: [] mobilize as tolerated [x] Bedrest for tonight [] PT [] OT [] PMNR    PULM:  Aspiration precautions    CV:  SBP goal 100-160mmHg    RENAL:  Fluids: IVF as NPO    GI:  Diet: NPO for now  Bowel regimen     ENDO:   Goal euglycemia (-180)  RISS as on steroids    HEME/ONC:  CT Chest/Abdomen/Pelvis to r/o primary oncologic process  VTE prophylaxis: [x] SCDs [] chemoprophylaxis [x] hold chemoprophylaxis due to: possible OR [x] high risk of DVT/PE on admission due to: tumors    ID:  Fever w/u; f/u cultures    SOCIAL/FAMILY:  [x] awaiting [] updated at bedside [] family meeting    CODE STATUS:  [x] Full Code [] DNR [] DNI [] Palliative/Comfort Care    DISPOSITION:  [x] ICU [] Stroke Unit [] Floor [] EMU [] RCU [] PCU    [x] Patient is at high risk of neurologic deterioration/death due to: brain compression    Time spent: 30 [x] critical care minutes    Contact: [phone]

## 2019-10-10 NOTE — DIETITIAN INITIAL EVALUATION ADULT. - ADD RECOMMEND
1) Continue consistent carbohydrate diet as ordered 2/2 hx DM. Obtain current A1c. Defer consistency to medical team, SLP. 2) Monitor and encourage PO intake. Encourage use of daily menus; staff to provide menu/meal assistance PRN. 3) Obtain further subjective wt/diet history as able. 4) Obtain current ht/wt; enter into EMR. 5) Monitor wt trends, nutrition related labs, skin integrity, hydration status and bowel regularity. 1) Continue consistent carbohydrate diet as ordered 2/2 hx DM. Obtain current A1c. Defer consistency to medical team, SLP. 2) Monitor and encourage PO intake. Encourage use of daily menus; staff to provide menu/meal assistance PRN. If suboptimal PO intake noted, consider oral supplement. 3) Obtain further subjective wt/diet history as able. 4) Obtain current ht/wt; enter into EMR. 5) Monitor wt trends, nutrition related labs, skin integrity, hydration status and bowel regularity.

## 2019-10-11 LAB
ANION GAP SERPL CALC-SCNC: 11 MMOL/L — SIGNIFICANT CHANGE UP (ref 5–17)
BUN SERPL-MCNC: 9 MG/DL — SIGNIFICANT CHANGE UP (ref 7–23)
CALCIUM SERPL-MCNC: 8.4 MG/DL — SIGNIFICANT CHANGE UP (ref 8.4–10.5)
CHLORIDE SERPL-SCNC: 109 MMOL/L — HIGH (ref 96–108)
CO2 SERPL-SCNC: 20 MMOL/L — LOW (ref 22–31)
CREAT SERPL-MCNC: 0.43 MG/DL — LOW (ref 0.5–1.3)
D DIMER BLD IA.RAPID-MCNC: 666 NG/ML DDU — HIGH
GLUCOSE BLDC GLUCOMTR-MCNC: 136 MG/DL — HIGH (ref 70–99)
GLUCOSE BLDC GLUCOMTR-MCNC: 138 MG/DL — HIGH (ref 70–99)
GLUCOSE BLDC GLUCOMTR-MCNC: 163 MG/DL — HIGH (ref 70–99)
GLUCOSE BLDC GLUCOMTR-MCNC: 189 MG/DL — HIGH (ref 70–99)
GLUCOSE SERPL-MCNC: 169 MG/DL — HIGH (ref 70–99)
HBA1C BLD-MCNC: 5.6 % — SIGNIFICANT CHANGE UP (ref 4–5.6)
HCT VFR BLD CALC: 49.7 % — HIGH (ref 34.5–45)
HGB BLD-MCNC: 17.6 G/DL — HIGH (ref 11.5–15.5)
MAGNESIUM SERPL-MCNC: 2 MG/DL — SIGNIFICANT CHANGE UP (ref 1.6–2.6)
MCHC RBC-ENTMCNC: 30.2 PG — SIGNIFICANT CHANGE UP (ref 27–34)
MCHC RBC-ENTMCNC: 35.4 GM/DL — SIGNIFICANT CHANGE UP (ref 32–36)
MCV RBC AUTO: 85.4 FL — SIGNIFICANT CHANGE UP (ref 80–100)
NRBC # BLD: 0 /100 WBCS — SIGNIFICANT CHANGE UP (ref 0–0)
PHOSPHATE SERPL-MCNC: 2.4 MG/DL — LOW (ref 2.5–4.5)
PLATELET # BLD AUTO: 233 K/UL — SIGNIFICANT CHANGE UP (ref 150–400)
POTASSIUM SERPL-MCNC: 3.9 MMOL/L — SIGNIFICANT CHANGE UP (ref 3.5–5.3)
POTASSIUM SERPL-SCNC: 3.9 MMOL/L — SIGNIFICANT CHANGE UP (ref 3.5–5.3)
RBC # BLD: 5.82 M/UL — HIGH (ref 3.8–5.2)
RBC # FLD: 12.9 % — SIGNIFICANT CHANGE UP (ref 10.3–14.5)
SODIUM SERPL-SCNC: 140 MMOL/L — SIGNIFICANT CHANGE UP (ref 135–145)
WBC # BLD: 9.56 K/UL — SIGNIFICANT CHANGE UP (ref 3.8–10.5)
WBC # FLD AUTO: 9.56 K/UL — SIGNIFICANT CHANGE UP (ref 3.8–10.5)

## 2019-10-11 PROCEDURE — 70553 MRI BRAIN STEM W/O & W/DYE: CPT | Mod: 26

## 2019-10-11 PROCEDURE — 99291 CRITICAL CARE FIRST HOUR: CPT

## 2019-10-11 PROCEDURE — 74177 CT ABD & PELVIS W/CONTRAST: CPT | Mod: 26

## 2019-10-11 PROCEDURE — 99223 1ST HOSP IP/OBS HIGH 75: CPT

## 2019-10-11 PROCEDURE — 71260 CT THORAX DX C+: CPT | Mod: 26

## 2019-10-11 RX ORDER — ALBUTEROL 90 UG/1
2 AEROSOL, METERED ORAL
Qty: 0 | Refills: 0 | DISCHARGE

## 2019-10-11 RX ORDER — ICOSAPENT ETHYL 500 MG/1
2 CAPSULE, LIQUID FILLED ORAL
Qty: 0 | Refills: 0 | DISCHARGE

## 2019-10-11 RX ORDER — POTASSIUM PHOSPHATE, MONOBASIC POTASSIUM PHOSPHATE, DIBASIC 236; 224 MG/ML; MG/ML
15 INJECTION, SOLUTION INTRAVENOUS ONCE
Refills: 0 | Status: COMPLETED | OUTPATIENT
Start: 2019-10-11 | End: 2019-10-11

## 2019-10-11 RX ORDER — MONTELUKAST 4 MG/1
1 TABLET, CHEWABLE ORAL
Qty: 0 | Refills: 0 | DISCHARGE

## 2019-10-11 RX ORDER — SIMVASTATIN 20 MG/1
1 TABLET, FILM COATED ORAL
Qty: 0 | Refills: 0 | DISCHARGE

## 2019-10-11 RX ADMIN — Medication 2: at 17:59

## 2019-10-11 RX ADMIN — SENNA PLUS 2 TABLET(S): 8.6 TABLET ORAL at 21:30

## 2019-10-11 RX ADMIN — MONTELUKAST 10 MILLIGRAM(S): 4 TABLET, CHEWABLE ORAL at 12:26

## 2019-10-11 RX ADMIN — Medication 100 MILLIGRAM(S): at 21:30

## 2019-10-11 RX ADMIN — Medication 4 MILLIGRAM(S): at 13:13

## 2019-10-11 RX ADMIN — Medication 2: at 23:37

## 2019-10-11 RX ADMIN — SIMVASTATIN 40 MILLIGRAM(S): 20 TABLET, FILM COATED ORAL at 21:29

## 2019-10-11 RX ADMIN — Medication 2: at 00:08

## 2019-10-11 RX ADMIN — Medication 100 MILLIGRAM(S): at 13:13

## 2019-10-11 RX ADMIN — ALBUTEROL 2 PUFF(S): 90 AEROSOL, METERED ORAL at 04:58

## 2019-10-11 RX ADMIN — Medication 27.5 MILLIGRAM(S): at 21:29

## 2019-10-11 RX ADMIN — FAMOTIDINE 20 MILLIGRAM(S): 10 INJECTION INTRAVENOUS at 05:37

## 2019-10-11 RX ADMIN — AMLODIPINE BESYLATE 10 MILLIGRAM(S): 2.5 TABLET ORAL at 05:37

## 2019-10-11 RX ADMIN — Medication 10 MILLIGRAM(S): at 05:37

## 2019-10-11 RX ADMIN — POTASSIUM PHOSPHATE, MONOBASIC POTASSIUM PHOSPHATE, DIBASIC 62.5 MILLIMOLE(S): 236; 224 INJECTION, SOLUTION INTRAVENOUS at 02:10

## 2019-10-11 RX ADMIN — Medication 100 MILLIGRAM(S): at 05:37

## 2019-10-11 RX ADMIN — ENOXAPARIN SODIUM 40 MILLIGRAM(S): 100 INJECTION SUBCUTANEOUS at 12:25

## 2019-10-11 RX ADMIN — SODIUM CHLORIDE 75 MILLILITER(S): 9 INJECTION INTRAMUSCULAR; INTRAVENOUS; SUBCUTANEOUS at 07:00

## 2019-10-11 RX ADMIN — FAMOTIDINE 20 MILLIGRAM(S): 10 INJECTION INTRAVENOUS at 18:00

## 2019-10-11 RX ADMIN — SODIUM CHLORIDE 75 MILLILITER(S): 9 INJECTION INTRAMUSCULAR; INTRAVENOUS; SUBCUTANEOUS at 13:14

## 2019-10-11 RX ADMIN — Medication 27.5 MILLIGRAM(S): at 13:12

## 2019-10-11 RX ADMIN — Medication 4 MILLIGRAM(S): at 05:37

## 2019-10-11 RX ADMIN — Medication 27.5 MILLIGRAM(S): at 05:37

## 2019-10-11 RX ADMIN — CHLORHEXIDINE GLUCONATE 1 APPLICATION(S): 213 SOLUTION TOPICAL at 21:29

## 2019-10-11 RX ADMIN — Medication 4 MILLIGRAM(S): at 21:29

## 2019-10-11 NOTE — PHYSICAL THERAPY INITIAL EVALUATION ADULT - PLANNED THERAPY INTERVENTIONS, PT EVAL
bed mobility training/gait training/GOALS: Pt will be able to neg 1 flight of steps, minAx1, in 4 weeks./transfer training balance training/bed mobility training/gait training/strengthening/transfer training/GOALS: Pt will be able to neg 1 flight of steps, minAx1, in 4 weeks.

## 2019-10-11 NOTE — PHYSICAL THERAPY INITIAL EVALUATION ADULT - BALANCE TRAINING, PT EVAL
GOAL: pt will be able to independently sit at edge of bed mobility performing UE manipulation without loss of balance within 4 weeks Pt will be able to independently ambulate 300ft without device & without loss of balance within 4 weeks

## 2019-10-11 NOTE — PHYSICAL THERAPY INITIAL EVALUATION ADULT - IMPAIRMENTS FOUND, PT EVAL
gait, locomotion, and balance/muscle strength/aerobic capacity/endurance bed mobility, transfers/cognitive impairment/aerobic capacity/endurance/gait, locomotion, and balance/muscle strength

## 2019-10-11 NOTE — PROGRESS NOTE ADULT - SUBJECTIVE AND OBJECTIVE BOX
Patient was seen and examined by me, for IVC filter placement; spoke with the patient and she noted that at age 37 yrs at [facility] had an IVC filter placed which is confirmed on CT Abdomen and Pelvis on 10/11/2019. Spoke with NSICU fellow and discussed the findings and based on this to reconsult Vascular medicine service as needed.

## 2019-10-11 NOTE — PHYSICAL THERAPY INITIAL EVALUATION ADULT - PERTINENT HX OF CURRENT PROBLEM, REHAB EVAL
Pt is 59F transferred from [place] for CT showing shift and vasogenic edema, & meningioma. Per tx paperwork pt is from [facility] has hx of CVA and HTN was sent in to OSH today for AMS. Per documentation unclear what her baseline MS is. At OSH pt had CTH done that showed exta-axial masses, likely meningiomas, there is mass effect on the left ventricle with 1.3 cm left to right midline shift.

## 2019-10-11 NOTE — PHYSICAL THERAPY INITIAL EVALUATION ADULT - GAIT TRAINING, PT EVAL
GOALS: Pt will be able to amb 100 ft, CGA, RW, in 4 weeks. GOALS: Pt will be able to independently ambulate 300ft without device within 4 weeks

## 2019-10-11 NOTE — PHYSICAL THERAPY INITIAL EVALUATION ADULT - TRANSFER TRAINING, PT EVAL
GOALS: Pt will be able to perform all functional transfers, independently, RW, in 4 weeks. GOALS: Pt will be able to perform transfers independently within 4 weeks

## 2019-10-11 NOTE — PHARMACOTHERAPY INTERVENTION NOTE - COMMENTS
[facility] fax over medication list    Allergies stated phenobarbital - without written reason for allergy  Tradjenta 5 mg daily   Vascepa 2 grams twice daily   albuterol 2 puffs 4 times per day standing  amlodipine 10 mg daily  enalapril 10 mg daily   metformiin 500 mg twice daily with meals  montelukast 10 mg in PM  zocor 40 mg daily

## 2019-10-11 NOTE — PHYSICAL THERAPY INITIAL EVALUATION ADULT - MANUAL MUSCLE TESTING RESULTS, REHAB EVAL
Pt strength grossly assessed, RUE/LE at least 4/5, LUE/LE at least 3+/5/grossly assessed due to Grossly > or = to 3/5 throughout

## 2019-10-11 NOTE — PHYSICAL THERAPY INITIAL EVALUATION ADULT - ADDITIONAL COMMENTS
Pt lives alone in assisted living community. Pt states she has 12 steps to enter front door, then 2 flights inside, however can take an elevator if needed. Pt PLOF: independent for all ADL's, transfers, and amb without AD.

## 2019-10-11 NOTE — PHYSICAL THERAPY INITIAL EVALUATION ADULT - IMPAIRMENTS CONTRIBUTING TO GAIT DEVIATIONS, PT EVAL
impaired motor control/impaired postural control/decreased endurance/decreased strength/cognition/impaired coordination/ataxic/impaired balance

## 2019-10-11 NOTE — PHYSICAL THERAPY INITIAL EVALUATION ADULT - FOLLOWS COMMANDS/ANSWERS QUESTIONS, REHAB EVAL
75% of the time/able to follow multistep instructions able to follow multistep instructions/100% of the time/75% of the time

## 2019-10-11 NOTE — PHYSICAL THERAPY INITIAL EVALUATION ADULT - BALANCE DISTURBANCE, IDENTIFIED IMPAIRMENT CONTRIBUTE, REHAB EVAL
impaired motor control/impaired coordination/impaired postural control/decreased strength/decreased endurance

## 2019-10-11 NOTE — PHYSICAL THERAPY INITIAL EVALUATION ADULT - DIAGNOSIS, PT EVAL
Decreased strength, endurance, balance Decreased strength, decreased endurance, impaired motor planning, impaired safety awareness, impaired balance, impaired functional mobilit

## 2019-10-11 NOTE — PROGRESS NOTE ADULT - ASSESSMENT
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 with cerebral edema and brain compression     NEURO: Neurocheck q2H  MRI head/brain w/ anesthesia 10/11  OnDexamethasone 4iv q8H  Seizure prophylaxis w/ VPA  Delirium precautions  Surgical plans per NSGY  malignancy workup- CT chest abd/pelvis  Activity: [] mobilize as tolerated [x] Bedrest for tonight [] PT [] OT [] PMNR    PULM:  Aspiration precautions  SpO2>92  ?Asthma - On montelukast 10 daily, Albuterol prn    CV:  SBP goal 100-160mmHg  On amlodipine 10 daily, and enalapril 10 daily    RENAL:  Fluids: IVF as NPO  voiding  replete lytes prn    GI:  Diet: NPO for imaging with anesthesia  Bowel regimen   GI ppx: Famotidine 20 BID (while on steroids)    ENDO:   Goal euglycemia (-180)  RISS as on steroids  A1c pending    HEME/ONC:  CT Chest/Abdomen/Pelvis to r/o primary oncologic process  VTE prophylaxis: [x] SCDs [x] chemoprophylaxis - Lovenox 40 sc daily    ID:  Fever w/u; f/u cultures    SOCIAL/FAMILY:  [] awaiting [x] updated at bedside [] family meeting    CODE STATUS:  [x] Full Code [] DNR [] DNI [] Palliative/Comfort Care    DISPOSITION: ?Floor if no immediate surgical plans    [x] Patient is at high risk of neurologic deterioration/death due to: brain mass    Time seen:  Time spent: ___ [] critical care minutes 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 with cerebral edema and brain compression     NEURO: Neurocheck q4H  MRI head/brain w/ anesthesia 10/11  OnDexamethasone 4iv q8H  Seizure prophylaxis w/ VPA  Delirium precautions  Surgical plans per NSGY  malignancy workup- CT chest abd/pelvis  Activity: [] mobilize as tolerated [x] Bedrest for tonight [] PT [] OT [] PMNR    PULM:  Aspiration precautions  SpO2>92  ?Asthma - On montelukast 10 daily, Albuterol prn    CV:  SBP goal 100-160mmHg  On amlodipine 10 daily, and enalapril 10 daily    RENAL:  Fluids: IVF as NPO  voiding  replete lytes prn    GI:  Diet: NPO for imaging with anesthesia  Bowel regimen   GI ppx: Famotidine 20 BID (while on steroids)    ENDO:   Goal euglycemia (-180)  RISS as on steroids  A1c pending    HEME/ONC:  IR consult for IVC filter; L popliteal DVT  Unable to AC given brain masses  CT Chest/Abdomen/Pelvis to r/o primary oncologic process  VTE prophylaxis: [x] SCDs [x] chemoprophylaxis - Lovenox 40 sc daily    ID: Monitor for fevers  Cultures remain negative    SOCIAL/FAMILY:  [] awaiting [x] updated at bedside [] family meeting    CODE STATUS:  [x] Full Code [] DNR [] DNI [] Palliative/Comfort Care    DISPOSITION: ?Floor if no immediate surgical plans    [x] Patient is at high risk of neurologic deterioration/death due to: brain mass    Time seen:  Time spent: ___ [] critical care minutes 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 with cerebral edema and brain compression     NEURO: Neurocheck q4H  MRI head/brain w/ anesthesia 10/11  OnDexamethasone 4iv q8H  Seizure prophylaxis w/ VPA  Delirium precautions  Surgical plans per NSGY  malignancy workup- CT chest abd/pelvis  Activity: [] mobilize as tolerated [x] Bedrest for tonight [] PT [] OT [] PMNR    PULM:  Aspiration precautions  SpO2>92  ?Asthma - On montelukast 10 daily, Albuterol prn    CV:  SBP goal 100-160mmHg  On amlodipine 10 daily, and enalapril 10 daily    RENAL:  Fluids: IVF as NPO  voiding  replete lytes prn    GI:  Diet: NPO for imaging with anesthesia  Bowel regimen   GI ppx: Famotidine 20 BID (while on steroids)    ENDO:   Goal euglycemia (-180)  RISS as on steroids  A1c pending    HEME/ONC:  IR consult for IVC filter; L popliteal DVT  Unable to AC given brain masses  However, IR notified us that patient already had an IVC filter at age 37.  IR doc confirmed IVC filter on CT scan.   CT Chest/Abdomen/Pelvis to r/o primary oncologic process  VTE prophylaxis: [x] SCDs [x] chemoprophylaxis - Lovenox 40 sc daily    ID: Monitor for fevers  Cultures remain negative    SOCIAL/FAMILY:  [] awaiting [x] updated at bedside [] family meeting    CODE STATUS:  [x] Full Code [] DNR [] DNI [] Palliative/Comfort Care    DISPOSITION: ?Floor if no immediate surgical plans    [x] Patient is at high risk of neurologic deterioration/death due to: brain mass    Time seen:  Time spent: ___ [] critical care minutes 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 with cerebral edema and brain compression     NEURO: Neurocheck q4H  MRI head/brain w/ anesthesia 10/11  OnDexamethasone 4iv q8H  Seizure prophylaxis w/ VPA  Delirium precautions  Surgical plans per NSGY  malignancy workup- CT chest abd/pelvis  Activity: [] mobilize as tolerated [x] Bedrest for tonight [] PT [] OT [] PMNR    PULM:  Aspiration precautions  SpO2>92  ?Asthma - On montelukast 10 daily, Albuterol prn    CV:  SBP goal 100-160mmHg  On amlodipine 10 daily, and enalapril 10 daily    RENAL:  Fluids: IVF as NPO  voiding  replete lytes prn    GI:  Diet: NPO for imaging with anesthesia  Bowel regimen   GI ppx: Famotidine 20 BID (while on steroids)    ENDO:   Goal euglycemia (-180)  RISS as on steroids  A1c pending    HEME/ONC:  IR consult for IVC filter; L popliteal DVT  Unable to AC given brain masses  However, IR notified us that patient already had an IVC filter at age 37.  IR doc confirmed IVC filter on CT scan.   CT Chest/Abdomen/Pelvis to r/o primary oncologic process  VTE prophylaxis: [x] SCDs [x] chemoprophylaxis - Lovenox 40 sc daily    ID: Monitor for fevers  Cultures remain negative      CODE STATUS:  [x] Full Code     DISPOSITION: ?Floor if no immediate surgical plans    [x] Patient is not critically ill yet medically complex    Time seen:  Time spent: 30 critical care minutes

## 2019-10-11 NOTE — PROGRESS NOTE ADULT - SUBJECTIVE AND OBJECTIVE BOX
SUMMARY: 59 Y F transferred from [place] for CT showing shift and vasogenic edema, and meningioma. Per tx paperwork pt is from NH has hx of CVA and HTN was sent in to OSH today for AMS. Per documentation unclear what her baseline MS is. At OSH pt had CTH done that showed exta-axial masses, likely meningiomas, there is mass effect on the left ventricle with 1.3 cm left to right midline shift. She was given Keppra 100 mh, decadron 12mg, was agitated, got haldol 5mg and plan was to do MRI however tx here for neurosurgery. Remainder of hx limited 2/2 patients MS and no family here at the moment (10 Oct 2019 01:22)      ROS: No pain  All other systems negative    ICU Vital Signs Last 24 Hrs  T(C): 36.5 (11 Oct 2019 07:00), Max: 37.2 (10 Oct 2019 15:00)  T(F): 97.7 (11 Oct 2019 07:00), Max: 99 (11 Oct 2019 03:00)  HR: 104 (11 Oct 2019 07:00) (87 - 137)  BP: 154/103 (11 Oct 2019 07:00) (108/75 - 169/103)  BP(mean): 116 (11 Oct 2019 07:00) (84 - 139)  ABP: --  ABP(mean): --  RR: 20 (11 Oct 2019 07:00) (15 - 28)  SpO2: 97% (11 Oct 2019 07:00) (94% - 99%)      I&O's Summary    10 Oct 2019 07:01  -  11 Oct 2019 07:00  --------------------------------------------------------  IN: 3750 mL / OUT: 3210 mL / NET: 540 mL    11 Oct 2019 07:01  -  11 Oct 2019 07:59  --------------------------------------------------------  IN: 75 mL / OUT: 0 mL / NET: 75 mL      MEDICATIONS  (STANDING):  amLODIPine   Tablet 10 milliGRAM(s) Oral daily  chlorhexidine 4% Liquid 1 Application(s) Topical <User Schedule>  dexamethasone  Injectable 4 milliGRAM(s) IV Push every 8 hours  docusate sodium 100 milliGRAM(s) Oral three times a day  enalapril 10 milliGRAM(s) Oral daily  enoxaparin Injectable 40 milliGRAM(s) SubCutaneous daily  famotidine    Tablet 20 milliGRAM(s) Oral two times a day  insulin lispro (HumaLOG) corrective regimen sliding scale   SubCutaneous every 6 hours  montelukast 10 milliGRAM(s) Oral daily  senna 2 Tablet(s) Oral at bedtime  simvastatin 40 milliGRAM(s) Oral at bedtime  sodium chloride 0.9%. 1000 milliLiter(s) (75 mL/Hr) IV Continuous <Continuous>  valproate sodium IVPB 500 milliGRAM(s) IV Intermittent every 8 hours    MEDICATIONS  (PRN):  ALBUTerol    90 MICROgram(s) HFA Inhaler 2 Puff(s) Inhalation every 6 hours PRN Bronchospasm      NEUROIMAGING:   Recent imaging studies were reviewed.    LAB RESULTS:                                     16.4   8.38  )-----------( 208      ( 10 Oct 2019 23:34 )             48.1       10-10    140  |  109<H>  |  9   ----------------------------<  169<H>  3.9   |  20<L>  |  0.43<L>    Ca    8.4      10 Oct 2019 23:34  Phos  2.4     10-10  Mg     2.0     10-10    TPro  7.3  /  Alb  4.0  /  TBili  0.5  /  DBili  x   /  AST  11  /  ALT  12  /  AlkPhos  69  10-09    PT/INR - ( 09 Oct 2019 23:24 )   PT: 12.0 sec;   INR: 1.05 ratio         PTT - ( 09 Oct 2019 23:24 )  PTT:23.6 sec    PHYSICAL EXAM:  General: Calm but fluctuating mental status; keeping eyes closed, impulsive  HEENT: MMM  Neuro:  -Mental status- No acute distress, AOx3, following commands  -CN- PERRL 3mm, EOMI, tongue midline, face symmetric  -Motor- Full strength throughout, no drift  -Sensation- intact to light touch  -Coordination- no dysmetria    CV: RRR, tachy  Pulm: Clear to auscultation  Abd: Soft, nontender, nondistended  Ext: No edema  Skin: warm, dry SUMMARY: 59 Y F transferred from [place] for CT showing shift and vasogenic edema, and meningioma. Per tx paperwork pt is from [facility] has hx of CVA and HTN was sent in to OSH today for AMS. Per documentation unclear what her baseline MS is. At OSH pt had CTH done that showed exta-axial masses, likely meningiomas, there is mass effect on the left ventricle with 1.3 cm left to right midline shift. She was given Keppra 100 mh, decadron 12mg, was agitated, got haldol 5mg and plan was to do MRI however tx here for neurosurgery. Remainder of hx limited 2/2 patients MS and no family here at the moment (10 Oct 2019 01:22)      ROS: No pain  All other systems negative    ICU Vital Signs Last 24 Hrs  T(C): 36.5 (11 Oct 2019 07:00), Max: 37.2 (10 Oct 2019 15:00)  T(F): 97.7 (11 Oct 2019 07:00), Max: 99 (11 Oct 2019 03:00)  HR: 104 (11 Oct 2019 07:00) (87 - 137)  BP: 154/103 (11 Oct 2019 07:00) (108/75 - 169/103)  BP(mean): 116 (11 Oct 2019 07:00) (84 - 139)  ABP: --  ABP(mean): --  RR: 20 (11 Oct 2019 07:00) (15 - 28)  SpO2: 97% (11 Oct 2019 07:00) (94% - 99%)      I&O's Summary    10 Oct 2019 07:01  -  11 Oct 2019 07:00  --------------------------------------------------------  IN: 3750 mL / OUT: 3210 mL / NET: 540 mL    11 Oct 2019 07:01  -  11 Oct 2019 07:59  --------------------------------------------------------  IN: 75 mL / OUT: 0 mL / NET: 75 mL      MEDICATIONS  (STANDING):  amLODIPine   Tablet 10 milliGRAM(s) Oral daily  chlorhexidine 4% Liquid 1 Application(s) Topical <User Schedule>  dexamethasone  Injectable 4 milliGRAM(s) IV Push every 8 hours  docusate sodium 100 milliGRAM(s) Oral three times a day  enalapril 10 milliGRAM(s) Oral daily  enoxaparin Injectable 40 milliGRAM(s) SubCutaneous daily  famotidine    Tablet 20 milliGRAM(s) Oral two times a day  insulin lispro (HumaLOG) corrective regimen sliding scale   SubCutaneous every 6 hours  montelukast 10 milliGRAM(s) Oral daily  senna 2 Tablet(s) Oral at bedtime  simvastatin 40 milliGRAM(s) Oral at bedtime  sodium chloride 0.9%. 1000 milliLiter(s) (75 mL/Hr) IV Continuous <Continuous>  valproate sodium IVPB 500 milliGRAM(s) IV Intermittent every 8 hours    MEDICATIONS  (PRN):  ALBUTerol    90 MICROgram(s) HFA Inhaler 2 Puff(s) Inhalation every 6 hours PRN Bronchospasm      NEUROIMAGING:   Recent imaging studies were reviewed.    CT Chest w/ IV Cont (10.11.19 @ 10:37) >  IMPRESSION:       Multiple pelvic masses, likely adnexal in origin. Apparent supracervical   hysterectomy. Consider further evaluation with pelvic ultrasound.  Appearance suggesting multiple spinal meningiomata. Consider spinal MRI   as clinically indicated          LAB RESULTS:                                     16.4   8.38  )-----------( 208      ( 10 Oct 2019 23:34 )             48.1       10-10    140  |  109<H>  |  9   ----------------------------<  169<H>  3.9   |  20<L>  |  0.43<L>    Ca    8.4      10 Oct 2019 23:34  Phos  2.4     10-10  Mg     2.0     10-10    TPro  7.3  /  Alb  4.0  /  TBili  0.5  /  DBili  x   /  AST  11  /  ALT  12  /  AlkPhos  69  10-09    PT/INR - ( 09 Oct 2019 23:24 )   PT: 12.0 sec;   INR: 1.05 ratio         PTT - ( 09 Oct 2019 23:24 )  PTT:23.6 sec    PHYSICAL EXAM:  General: Calm but fluctuating mental status; keeping eyes closed, impulsive  HEENT: MMM  Neuro:  -Mental status- No acute distress, AOx3, following commands  -CN- PERRL 3mm, EOMI, tongue midline, face symmetric  -Motor- Full strength throughout, no drift  -Sensation- intact to light touch  -Coordination- no dysmetria    CV: RRR, tachy  Pulm: Clear to auscultation  Abd: Soft, nontender, nondistended  Ext: No edema  Skin: warm, dry

## 2019-10-11 NOTE — PHYSICAL THERAPY INITIAL EVALUATION ADULT - BED MOBILITY TRAINING, PT EVAL
GOALS: Pt will be able to perform bed mob, independently in 4 weeks. GOALS: Pt will be able to perform bed mobility independently within 4 weeks.

## 2019-10-11 NOTE — PHYSICAL THERAPY INITIAL EVALUATION ADULT - ASR WT BEARING STATUS EVAL
CTH: Multiple meningiomas as detailed in the body the report.Chronic right MCA territory infarct with volume loss.Similar rightward midline shift of 1.2 cm. No effacement of the basal cisterns or hydrocephalus./no weight-bearing restrictions

## 2019-10-12 LAB
ANION GAP SERPL CALC-SCNC: 16 MMOL/L — SIGNIFICANT CHANGE UP (ref 5–17)
BUN SERPL-MCNC: 23 MG/DL — SIGNIFICANT CHANGE UP (ref 7–23)
CALCIUM SERPL-MCNC: 9.2 MG/DL — SIGNIFICANT CHANGE UP (ref 8.4–10.5)
CHLORIDE SERPL-SCNC: 106 MMOL/L — SIGNIFICANT CHANGE UP (ref 96–108)
CO2 SERPL-SCNC: 18 MMOL/L — LOW (ref 22–31)
CREAT SERPL-MCNC: 0.88 MG/DL — SIGNIFICANT CHANGE UP (ref 0.5–1.3)
GLUCOSE BLDC GLUCOMTR-MCNC: 143 MG/DL — HIGH (ref 70–99)
GLUCOSE BLDC GLUCOMTR-MCNC: 156 MG/DL — HIGH (ref 70–99)
GLUCOSE BLDC GLUCOMTR-MCNC: 190 MG/DL — HIGH (ref 70–99)
GLUCOSE BLDC GLUCOMTR-MCNC: 214 MG/DL — HIGH (ref 70–99)
GLUCOSE SERPL-MCNC: 208 MG/DL — HIGH (ref 70–99)
MAGNESIUM SERPL-MCNC: 2.3 MG/DL — SIGNIFICANT CHANGE UP (ref 1.6–2.6)
PHOSPHATE SERPL-MCNC: 3.8 MG/DL — SIGNIFICANT CHANGE UP (ref 2.5–4.5)
POTASSIUM SERPL-MCNC: 4.2 MMOL/L — SIGNIFICANT CHANGE UP (ref 3.5–5.3)
POTASSIUM SERPL-SCNC: 4.2 MMOL/L — SIGNIFICANT CHANGE UP (ref 3.5–5.3)
SODIUM SERPL-SCNC: 140 MMOL/L — SIGNIFICANT CHANGE UP (ref 135–145)

## 2019-10-12 PROCEDURE — 99233 SBSQ HOSP IP/OBS HIGH 50: CPT

## 2019-10-12 RX ADMIN — SENNA PLUS 2 TABLET(S): 8.6 TABLET ORAL at 22:20

## 2019-10-12 RX ADMIN — FAMOTIDINE 20 MILLIGRAM(S): 10 INJECTION INTRAVENOUS at 17:44

## 2019-10-12 RX ADMIN — Medication 4 MILLIGRAM(S): at 22:19

## 2019-10-12 RX ADMIN — AMLODIPINE BESYLATE 10 MILLIGRAM(S): 2.5 TABLET ORAL at 05:13

## 2019-10-12 RX ADMIN — Medication 2: at 17:44

## 2019-10-12 RX ADMIN — Medication 27.5 MILLIGRAM(S): at 22:19

## 2019-10-12 RX ADMIN — SIMVASTATIN 40 MILLIGRAM(S): 20 TABLET, FILM COATED ORAL at 22:20

## 2019-10-12 RX ADMIN — Medication 10 MILLIGRAM(S): at 05:13

## 2019-10-12 RX ADMIN — Medication 27.5 MILLIGRAM(S): at 05:12

## 2019-10-12 RX ADMIN — CHLORHEXIDINE GLUCONATE 1 APPLICATION(S): 213 SOLUTION TOPICAL at 22:19

## 2019-10-12 RX ADMIN — Medication 100 MILLIGRAM(S): at 13:13

## 2019-10-12 RX ADMIN — Medication 4 MILLIGRAM(S): at 13:13

## 2019-10-12 RX ADMIN — Medication 100 MILLIGRAM(S): at 05:12

## 2019-10-12 RX ADMIN — MONTELUKAST 10 MILLIGRAM(S): 4 TABLET, CHEWABLE ORAL at 12:13

## 2019-10-12 RX ADMIN — Medication 4 MILLIGRAM(S): at 05:13

## 2019-10-12 RX ADMIN — Medication 100 MILLIGRAM(S): at 22:20

## 2019-10-12 RX ADMIN — Medication 2: at 06:11

## 2019-10-12 RX ADMIN — FAMOTIDINE 20 MILLIGRAM(S): 10 INJECTION INTRAVENOUS at 05:12

## 2019-10-12 RX ADMIN — Medication 4: at 23:13

## 2019-10-12 RX ADMIN — Medication 27.5 MILLIGRAM(S): at 13:18

## 2019-10-12 RX ADMIN — ENOXAPARIN SODIUM 40 MILLIGRAM(S): 100 INJECTION SUBCUTANEOUS at 12:13

## 2019-10-12 NOTE — PROGRESS NOTE ADULT - ASSESSMENT
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

## 2019-10-12 NOTE — PROGRESS NOTE ADULT - ASSESSMENT
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 with cerebral edema and brain compression     NEURO: Neurocheck q4H  MRI head/brain w/ anesthesia 10/11  OnDexamethasone 4iv q8H  Seizure prophylaxis w/ VPA  Delirium precautions  Surgical plans per NSGY  malignancy workup- CT chest abd/pelvis  Activity: [] mobilize as tolerated [x] Bedrest for tonight [] PT [] OT [] PMNR    PULM:  Aspiration precautions  SpO2>92  ?Asthma - On montelukast 10 daily, Albuterol prn    CV:  SBP goal 100-160mmHg  On amlodipine 10 daily, and enalapril 10 daily    RENAL:  Fluids: IVF as NPO  voiding  replete lytes prn    GI:  Diet: NPO for imaging with anesthesia  Bowel regimen   GI ppx: Famotidine 20 BID (while on steroids)    ENDO:   Goal euglycemia (-180)  RISS as on steroids  A1c pending    HEME/ONC:  IR consult for IVC filter; L popliteal DVT  Unable to AC given brain masses  However, IR notified us that patient already had an IVC filter at age 37.  IR doc confirmed IVC filter on CT scan.   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 with cerebral edema and brain compression     NEURO: Neurocheck q4H  OnDexamethasone 4iv q8H  Continue VPA for mood  Delirium precautions  Surgical plans per NSGY  malignancy workup- CT chest abd/pelvis  Activity: [] mobilize as tolerated [x] Bedrest for tonight [] PT [] OT [] PMNR    PULM:  Aspiration precautions  SpO2>92  ?Asthma - On montelukast 10 daily, Albuterol prn    CV:  SBP goal 100-160mmHg  On amlodipine 10 daily, and enalapril 10 daily    RENAL:  Fluids: IVL      GI:  Diet: Regular diet  Bowel regimen   GI ppx: Famotidine 20 BID (while on steroids)  Last BM: 10/10/19    ENDO:   Goal euglycemia (-180)  RISS as on steroids  A1c pending    HEME/ONC:  IR consult for IVC filter; L popliteal DVT  Unable to AC given brain masses  However, IR notified us that patient already had an IVC filter at age 37.  IR doc confirmed IVC filter on CT scan.   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 with cerebral edema and brain compression     NEURO: Neurocheck q4H  OnDexamethasone 4iv q8H  Continue VPA for mood  Delirium precautions  Surgical plans per NSGY  malignancy workup- CT chest abd/pelvis  Activity: [] mobilize as tolerated [x] Bedrest for tonight [] PT [] OT [] PMNR    PULM:  Aspiration precautions  SpO2>92  ?Asthma - On montelukast 10 daily, Albuterol prn    CV:  SBP goal 100-160mmHg  On amlodipine 10 daily, and enalapril 10 daily    RENAL:  Fluids: IVL      GI:  Diet: Regular diet  Bowel regimen   GI ppx: Famotidine 20 BID (while on steroids)  Last BM: 10/10/19    ENDO:   Goal euglycemia (-180)  RISS as on steroids  A1c pending    HEME/ONC:  IR consult for IVC filter; L popliteal DVT  Unable to AC given brain masses  However, IR notified us that patient already had an IVC filter at age 37.  IR doc confirmed IVC filter on CT scan.   CT Chest/Abdomen/Pelvis to r/o primary oncologic process  VTE prophylaxis: [x] SCDs [x] chemoprophylaxis - Lovenox 40 sc daily    ID: Monitor for fevers  Cultures remain negative      CODE STATUS:  [x] Full Code     DISPOSITION: Floor-observation or close to nurses station due to impulsivity    [x] Patient is not critically ill yet medically complex

## 2019-10-12 NOTE — PROGRESS NOTE ADULT - SUBJECTIVE AND OBJECTIVE BOX
CT chest/abd/pelvis shows multiple lesions    sundowns - spont tries to get out of the bed, gets confused     No acute distress, AOx3, following commands  -CN- PERRL 3mm, EOMI, tongue midline, face symmetric  -Motor- LUE drift. b/l UE equal, full strength.  RLE - 5/5, LLE - 4+/5

## 2019-10-12 NOTE — PROGRESS NOTE ADULT - SUBJECTIVE AND OBJECTIVE BOX
SUMMARY: 59 Y F transferred from [place] for CT showing shift and vasogenic edema, and meningioma. Per tx paperwork pt is from NH has hx of CVA and HTN was sent in to OSH today for AMS. Per documentation unclear what her baseline MS is. At OSH pt had CTH done that showed exta-axial masses, likely meningiomas, there is mass effect on the left ventricle with 1.3 cm left to right midline shift. She was given Keppra 100 mh, decadron 12mg, was agitated, got haldol 5mg and plan was to do MRI however tx here for neurosurgery. Remainder of hx limited 2/2 patients MS and no family here at the moment (10 Oct 2019 01:22)    Overnight Events:     ROS: negative [] unable to obtain as patient is comatose/intubated/aphasic []     VITALS:   T(C): 36.9 (10-12-19 @ 07:00), Max: 36.9 (10-11-19 @ 23:00)  HR: 84 (10-12-19 @ 07:00) (84 - 107)  BP: 121/83 (10-12-19 @ 07:00) (114/65 - 163/99)  RR: 16 (10-12-19 @ 07:00) (16 - 20)  SpO2: 95% (10-12-19 @ 07:00) (95% - 100%)    10-11-19 @ 07:01  -  10-12-19 @ 07:00  --------------------------------------------------------  IN: 1325 mL / OUT: 1650 mL / NET: -325 mL      LABS:                          17.6   9.56  )-----------( 233      ( 11 Oct 2019 23:47 )             49.7     10-11    140  |  106  |  23  ----------------------------<  208<H>  4.2   |  18<L>  |  0.88    Ca    9.2      11 Oct 2019 23:47  Phos  3.8     10-11  Mg     2.3     10-11        MEDS:  MEDICATIONS  (STANDING):  amLODIPine   Tablet 10 milliGRAM(s) Oral daily  chlorhexidine 4% Liquid 1 Application(s) Topical <User Schedule>  dexamethasone  Injectable 4 milliGRAM(s) IV Push every 8 hours  docusate sodium 100 milliGRAM(s) Oral three times a day  enalapril 10 milliGRAM(s) Oral daily  enoxaparin Injectable 40 milliGRAM(s) SubCutaneous daily  famotidine    Tablet 20 milliGRAM(s) Oral two times a day  insulin lispro (HumaLOG) corrective regimen sliding scale   SubCutaneous every 6 hours  montelukast 10 milliGRAM(s) Oral daily  senna 2 Tablet(s) Oral at bedtime  simvastatin 40 milliGRAM(s) Oral at bedtime  valproate sodium IVPB 500 milliGRAM(s) IV Intermittent every 8 hours      PHYSICAL EXAM:  General: Calm but fluctuating mental status; keeping eyes closed, impulsive  HEENT: MMM  Neuro:  -Mental status- No acute distress, AOx3, following commands  -CN- PERRL 3mm, EOMI, tongue midline, face symmetric  -Motor- Full strength throughout, no drift  -Sensation- intact to light touch  -Coordination- no dysmetria    CV: RRR, tachy  Pulm: Clear to auscultation  Abd: Soft, nontender, nondistended  Ext: No edema  Skin: warm, dry SUMMARY: 59 Y F transferred from [place] for CT showing shift and vasogenic edema, and meningioma. Per tx paperwork pt is from NH has hx of CVA and HTN was sent in to OSH today for AMS. Per documentation unclear what her baseline MS is. At OSH pt had CTH done that showed exta-axial masses, likely meningiomas, there is mass effect on the left ventricle with 1.3 cm left to right midline shift. She was given Keppra 100 mh, decadron 12mg, was agitated, got haldol 5mg and plan was to do MRI however tx here for neurosurgery. Remainder of hx limited 2/2 patients MS and no family here at the moment (10 Oct 2019 01:22)    Overnight Events:     ROS: negative [] unable to obtain as patient is comatose/intubated/aphasic []     VITALS:   T(C): 36.9 (10-12-19 @ 07:00), Max: 36.9 (10-11-19 @ 23:00)  HR: 84 (10-12-19 @ 07:00) (84 - 107)  BP: 121/83 (10-12-19 @ 07:00) (114/65 - 163/99)  RR: 16 (10-12-19 @ 07:00) (16 - 20)  SpO2: 95% (10-12-19 @ 07:00) (95% - 100%)    10-11-19 @ 07:01  -  10-12-19 @ 07:00  --------------------------------------------------------  IN: 1325 mL / OUT: 1650 mL / NET: -325 mL      LABS:                          17.6   9.56  )-----------( 233      ( 11 Oct 2019 23:47 )             49.7     10-11    140  |  106  |  23  ----------------------------<  208<H>  4.2   |  18<L>  |  0.88    Ca    9.2      11 Oct 2019 23:47  Phos  3.8     10-11  Mg     2.3     10-11        MEDS:  MEDICATIONS  (STANDING):  amLODIPine   Tablet 10 milliGRAM(s) Oral daily  chlorhexidine 4% Liquid 1 Application(s) Topical <User Schedule>  dexamethasone  Injectable 4 milliGRAM(s) IV Push every 8 hours  docusate sodium 100 milliGRAM(s) Oral three times a day  enalapril 10 milliGRAM(s) Oral daily  enoxaparin Injectable 40 milliGRAM(s) SubCutaneous daily  famotidine    Tablet 20 milliGRAM(s) Oral two times a day  insulin lispro (HumaLOG) corrective regimen sliding scale   SubCutaneous every 6 hours  montelukast 10 milliGRAM(s) Oral daily  senna 2 Tablet(s) Oral at bedtime  simvastatin 40 milliGRAM(s) Oral at bedtime  valproate sodium IVPB 500 milliGRAM(s) IV Intermittent every 8 hours      PHYSICAL EXAM:  General: Calm but fluctuating mental status;  impulsive  HEENT: MMM  Neuro:  -Mental status- No acute distress, AOx3, following commands  -CN- PERRL 3mm, EOMI, tongue midline, face symmetric  -Motor- LUE drift. b/l UE equal, full strength.  RLE - 5/5, LLE - 4+/5  -Sensation- intact to light touch  -Coordination- no dysmetria    CV: RRR, tachy  Pulm: Clear to auscultation  Abd: Soft, nontender, nondistended  Ext: No edema  Skin: warm, dry SUMMARY: 59 Y F transferred from [place] for CT showing shift and vasogenic edema, and meningioma. Per tx paperwork pt is from NH has hx of CVA and HTN was sent in to OSH today for AMS. Per documentation unclear what her baseline MS is. At OSH pt had CTH done that showed exta-axial masses, likely meningiomas, there is mass effect on the left ventricle with 1.3 cm left to right midline shift. She was given Keppra 100 mh, decadron 12mg, was agitated, got haldol 5mg and plan was to do MRI however tx here for neurosurgery. Remainder of hx limited 2/2 patients MS and no family here at the moment (10 Oct 2019 01:22)    Overnight Events: Noted to be in chair. Pleasant. No complaints offered.     ROS: Negative unless specified.     VITALS:   T(C): 36.9 (10-12-19 @ 07:00), Max: 36.9 (10-11-19 @ 23:00)  HR: 84 (10-12-19 @ 07:00) (84 - 107)  BP: 121/83 (10-12-19 @ 07:00) (114/65 - 163/99)  RR: 16 (10-12-19 @ 07:00) (16 - 20)  SpO2: 95% (10-12-19 @ 07:00) (95% - 100%)    10-11-19 @ 07:01  -  10-12-19 @ 07:00  --------------------------------------------------------  IN: 1325 mL / OUT: 1650 mL / NET: -325 mL      LABS:                          17.6   9.56  )-----------( 233      ( 11 Oct 2019 23:47 )             49.7     10-11    140  |  106  |  23  ----------------------------<  208<H>  4.2   |  18<L>  |  0.88    Ca    9.2      11 Oct 2019 23:47  Phos  3.8     10-11  Mg     2.3     10-11        MEDS:  MEDICATIONS  (STANDING):  amLODIPine   Tablet 10 milliGRAM(s) Oral daily  chlorhexidine 4% Liquid 1 Application(s) Topical <User Schedule>  dexamethasone  Injectable 4 milliGRAM(s) IV Push every 8 hours  docusate sodium 100 milliGRAM(s) Oral three times a day  enalapril 10 milliGRAM(s) Oral daily  enoxaparin Injectable 40 milliGRAM(s) SubCutaneous daily  famotidine    Tablet 20 milliGRAM(s) Oral two times a day  insulin lispro (HumaLOG) corrective regimen sliding scale   SubCutaneous every 6 hours  montelukast 10 milliGRAM(s) Oral daily  senna 2 Tablet(s) Oral at bedtime  simvastatin 40 milliGRAM(s) Oral at bedtime  valproate sodium IVPB 500 milliGRAM(s) IV Intermittent every 8 hours      PHYSICAL EXAM:  General: Pleasant.   HEENT: MMM   Neuro:  -Mental status- No acute distress, AOx3, following commands  -CN- PERRL 3mm, EOMI, tongue midline, face symmetric  -Motor- LUE drift. b/l UE equal, full strength.  RLE - 5/5, LLE - 4+/5  -Sensation- intact to light touch    CV: RRR, tachy  Pulm: Clear to auscultation  Abd: Soft, nontender, nondistended  Ext: No edema  Skin: warm, dry

## 2019-10-12 NOTE — PROGRESS NOTE ADULT - SUBJECTIVE AND OBJECTIVE BOX
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 It is currently unclear what her baseline mental status is but was notably agitated and uncooperative at [facility] and received haloperidol to calm her. She also received levetiracetam and dexamethasone prior to transfer to [facility]. Of note, patient with low grade fever in ED.    24 HOUR EVENTS:  CT abdomen with several abdominal masses, likely adnexal in origin and confirmation of IVCF placement.     REVIEW OF SYSTEMS:  No headache, no chest pain    VITALS/DATA/ORDERS: [x] Reviewed    EXAMINATION:  General: No acute distress  HEENT: Right eyelid with small raised growth that appears to be subdermal without erythema  CV: Regular  Lungs: Good air entry  Abd: Soft, +BS  Ext: No edema  Neuro: Awake, alert, oriented to self, hospital and year, follows with prompting, tangential and nonsensical speech at times, impulsive, moves all limbs at least 4+/5

## 2019-10-12 NOTE — PROGRESS NOTE ADULT - ASSESSMENT
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 with cerebral edema and brain compression     NEURO:  Steroids for cerebral edema  Seizure prophylaxis  Delirium precautions  NSGY for operative planning  Activity: [x] mobilize as tolerated [] Bedrest for tonight [] PT [] OT [] PMNR    PULM:  Aspiration precautions    CV:  SBP goal 100-160mmHg    RENAL:  Fluids: IVL    GI:  Diet: Puree  Bowel regimen     ENDO:   Goal euglycemia (-180)  RISS as on steroids    HEME/ONC:  CT Chest/Abdomen/Pelvis: 3 pelvic masses  Left calf DVT: has IVCF, chemoppx per NSGY    ID:  Fever w/u; f/u cultures    SOCIAL/FAMILY:  [x] awaiting [] updated at bedside [] family meeting    CODE STATUS:  [x] Full Code [] DNR [] DNI [] Palliative/Comfort Care    DISPOSITION:  [] ICU [] Stroke Unit [x] Floor/Observation [] EMU [] RCU [] PCU    [x] Patient is at not critically ill but is medically complex    Contact: [phone]

## 2019-10-13 LAB
GLUCOSE BLDC GLUCOMTR-MCNC: 155 MG/DL — HIGH (ref 70–99)
GLUCOSE BLDC GLUCOMTR-MCNC: 164 MG/DL — HIGH (ref 70–99)
GLUCOSE BLDC GLUCOMTR-MCNC: 259 MG/DL — HIGH (ref 70–99)

## 2019-10-13 PROCEDURE — 99233 SBSQ HOSP IP/OBS HIGH 50: CPT

## 2019-10-13 RX ORDER — VALPROIC ACID (AS SODIUM SALT) 250 MG/5ML
500 SOLUTION, ORAL ORAL EVERY 8 HOURS
Refills: 0 | Status: DISCONTINUED | OUTPATIENT
Start: 2019-10-13 | End: 2019-10-21

## 2019-10-13 RX ORDER — VALPROIC ACID (AS SODIUM SALT) 250 MG/5ML
500 SOLUTION, ORAL ORAL EVERY 8 HOURS
Refills: 0 | Status: DISCONTINUED | OUTPATIENT
Start: 2019-10-13 | End: 2019-10-13

## 2019-10-13 RX ADMIN — SIMVASTATIN 40 MILLIGRAM(S): 20 TABLET, FILM COATED ORAL at 22:33

## 2019-10-13 RX ADMIN — AMLODIPINE BESYLATE 10 MILLIGRAM(S): 2.5 TABLET ORAL at 05:52

## 2019-10-13 RX ADMIN — Medication 100 MILLIGRAM(S): at 22:32

## 2019-10-13 RX ADMIN — Medication 10 MILLIGRAM(S): at 05:52

## 2019-10-13 RX ADMIN — Medication 500 MILLIGRAM(S): at 22:37

## 2019-10-13 RX ADMIN — Medication 100 MILLIGRAM(S): at 14:21

## 2019-10-13 RX ADMIN — SENNA PLUS 2 TABLET(S): 8.6 TABLET ORAL at 22:32

## 2019-10-13 RX ADMIN — Medication 27.5 MILLIGRAM(S): at 14:22

## 2019-10-13 RX ADMIN — Medication 4 MILLIGRAM(S): at 22:32

## 2019-10-13 RX ADMIN — Medication 2: at 06:25

## 2019-10-13 RX ADMIN — MONTELUKAST 10 MILLIGRAM(S): 4 TABLET, CHEWABLE ORAL at 11:02

## 2019-10-13 RX ADMIN — Medication 27.5 MILLIGRAM(S): at 05:52

## 2019-10-13 RX ADMIN — Medication 6: at 11:50

## 2019-10-13 RX ADMIN — FAMOTIDINE 20 MILLIGRAM(S): 10 INJECTION INTRAVENOUS at 17:47

## 2019-10-13 RX ADMIN — ENOXAPARIN SODIUM 40 MILLIGRAM(S): 100 INJECTION SUBCUTANEOUS at 11:02

## 2019-10-13 RX ADMIN — Medication 4 MILLIGRAM(S): at 05:52

## 2019-10-13 RX ADMIN — Medication 100 MILLIGRAM(S): at 05:52

## 2019-10-13 RX ADMIN — Medication 2: at 17:47

## 2019-10-13 RX ADMIN — Medication 4 MILLIGRAM(S): at 14:22

## 2019-10-13 RX ADMIN — FAMOTIDINE 20 MILLIGRAM(S): 10 INJECTION INTRAVENOUS at 05:52

## 2019-10-13 NOTE — PROGRESS NOTE ADULT - SUBJECTIVE AND OBJECTIVE BOX
HPI:  59 Y F transferred from [place] for CT showing shift and vasogenic edema, and meningioma. Per tx paperwork pt is from NH has hx of CVA and HTN was sent in to OSH today for AMS. Per documentation unclear what her baseline MS is. At OSH pt had CTH done that showed exta-axial masses, likely meningiomas, there is mass effect on the left ventricle with 1.3 cm left to right midline shift. She was given Keppra 100 mh, decadron 12mg, was agitated, got haldol 5mg and plan was to do MRI however tx here for neurosurgery. Remainder of hx limited 2/2 patients MS and no family here at the moment (10 Oct 2019 01:22)    SURGERY:   PAST MEDICAL HISTORY: DM (diabetes mellitus)  HTN (hypertension)  H/O ischemic right MCA stroke  COPD with asthma    PAST SURGICAL HISTORY:   FAMILY HISTORY:    ALLERGIES: phenobarbital (Unknown)    **************************************  **************************************    OVERNIGHT EVENTS: [] None    ROS  Unobtainable due to mental status[] Negative []  Positives:    ADMISSION SCORES: GCS: HH: MF: NIHSS: RASS: CAM-ICU: ICP:    ICU Vital Signs Last 24 Hrs  T(C): 36.9 (13 Oct 2019 07:00), Max: 37 (12 Oct 2019 15:00)  T(F): 98.4 (13 Oct 2019 07:00), Max: 98.6 (12 Oct 2019 15:00)  HR: 60 (13 Oct 2019 07:00) (58 - 81)  BP: 119/77 (13 Oct 2019 07:00) (117/67 - 128/72)  BP(mean): 91 (13 Oct 2019 07:00) (78 - 101)  ABP: --  ABP(mean): --  RR: 17 (13 Oct 2019 07:00) (13 - 18)  SpO2: 100% (13 Oct 2019 07:00) (95% - 100%)     10-12 @ 07:01  -  10-13 @ 07:00  --------------------------------------------------------  IN: 880 mL / OUT: 0 mL / NET: 880 mL           DEVICES: [] Restraints [] TOBY/HMV []LD [] ET tube [] Trach [] Chest Tube [] A-line [] Mendez [] NGT [] Rectal Tube [] EVD [] CVL  [] ICP/LiCOx    NEUROIMAGING:     EEG REPORT:     MEDICATIONS:  ALBUTerol    90 MICROgram(s) HFA Inhaler 2 Puff(s) Inhalation every 6 hours PRN  amLODIPine   Tablet 10 milliGRAM(s) Oral daily  chlorhexidine 4% Liquid 1 Application(s) Topical <User Schedule>  dexamethasone  Injectable 4 milliGRAM(s) IV Push every 8 hours  docusate sodium 100 milliGRAM(s) Oral three times a day  enalapril 10 milliGRAM(s) Oral daily  enoxaparin Injectable 40 milliGRAM(s) SubCutaneous daily  famotidine    Tablet 20 milliGRAM(s) Oral two times a day  insulin lispro (HumaLOG) corrective regimen sliding scale   SubCutaneous every 6 hours  montelukast 10 milliGRAM(s) Oral daily  senna 2 Tablet(s) Oral at bedtime  simvastatin 40 milliGRAM(s) Oral at bedtime  valproate sodium IVPB 500 milliGRAM(s) IV Intermittent every 8 hours      PHYSICAL EXAM:  General: Pleasant.   HEENT: MMM   Neuro:  -Mental status- No acute distress, AOx3, following commands  -CN- PERRL 3mm, EOMI, tongue midline, face symmetric  -Motor- LUE drift. b/l UE equal, full strength.  RLE - 5/5, LLE - 4+/5  -Sensation- intact to light touch  CV: RRR, tachy  Pulm: Clear to auscultation  Abd: Soft, nontender, nondistended  Ext: No edema  Skin: warm, dry      LABS:                        17.6   9.56  )-----------( 233      ( 11 Oct 2019 23:47 )             49.7    10-11    140  |  106  |  23  ----------------------------<  208<H>  4.2   |  18<L>  |  0.88    Ca    9.2      11 Oct 2019 23:47  Phos  3.8     10-11  Mg     2.3     10-11

## 2019-10-13 NOTE — PROGRESS NOTE ADULT - ASSESSMENT
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 with cerebral edema and brain compression     NEURO: Neurocheck q4H  OnDexamethasone 4iv q8H  Continue VPA for mood  Delirium precautions  Surgical plans per NSGY  malignancy workup- CT chest abd/pelvis  Activity: [] mobilize as tolerated [x] Bedrest for tonight [] PT [] OT [] PMNR    PULM:  Aspiration precautions  SpO2>92  ?Asthma - On montelukast 10 daily, Albuterol prn    CV:  SBP goal 100-160mmHg  On amlodipine 10 daily, and enalapril 10 daily    RENAL:  Fluids: IVL      GI:  Diet: Regular diet  Bowel regimen   GI ppx: Famotidine 20 BID (while on steroids)  Last BM: 10/10/19    ENDO:   Goal euglycemia (-180)  RISS as on steroids  A1c pending    HEME/ONC:  IR consult for IVC filter; L popliteal DVT  Unable to AC given brain masses  However, IR notified us that patient already had an IVC filter at age 37.  IR doc confirmed IVC filter on CT scan.   CT Chest/Abdomen/Pelvis to r/o primary oncologic process  VTE prophylaxis: [x] SCDs [x] chemoprophylaxis - Lovenox 40 sc daily    ID: Monitor for fevers  Cultures remain negative      CODE STATUS:  [x] Full Code     DISPOSITION: Floor-observation or close to nurses station due to impulsivity

## 2019-10-13 NOTE — PROGRESS NOTE ADULT - SUBJECTIVE AND OBJECTIVE BOX
Patient Seen and Examined.     Overnight Events:   None    T(C): 37 (10-12-19 @ 23:00), Max: 37 (10-12-19 @ 15:00)  HR: 63 (10-13-19 @ 03:00) (58 - 101)  BP: 117/67 (10-13-19 @ 03:00) (117/67 - 153/83)  RR: 13 (10-13-19 @ 03:00) (13 - 18)  SpO2: 95% (10-13-19 @ 03:00) (95% - 98%)    Exam:   Awake, Ox3, Rt pupils 4mm sluggish, lt pupil 4mm react, follows commands, impulsive at times, LUE drift, TINSLEY 4+/5             ALBUTerol    90 MICROgram(s) HFA Inhaler 2 Puff(s) Inhalation every 6 hours PRN  amLODIPine   Tablet 10 milliGRAM(s) Oral daily  chlorhexidine 4% Liquid 1 Application(s) Topical <User Schedule>  dexamethasone  Injectable 4 milliGRAM(s) IV Push every 8 hours  docusate sodium 100 milliGRAM(s) Oral three times a day  enalapril 10 milliGRAM(s) Oral daily  enoxaparin Injectable 40 milliGRAM(s) SubCutaneous daily  famotidine    Tablet 20 milliGRAM(s) Oral two times a day  insulin lispro (HumaLOG) corrective regimen sliding scale   SubCutaneous every 6 hours  montelukast 10 milliGRAM(s) Oral daily  senna 2 Tablet(s) Oral at bedtime  simvastatin 40 milliGRAM(s) Oral at bedtime  valproate sodium IVPB 500 milliGRAM(s) IV Intermittent every 8 hours                            17.6   9.56  )-----------( 233      ( 11 Oct 2019 23:47 )             49.7     10-11    140  |  106  |  23  ----------------------------<  208<H>  4.2   |  18<L>  |  0.88    Ca    9.2      11 Oct 2019 23:47  Phos  3.8     10-11  Mg     2.3     10-11          Imaging:

## 2019-10-14 LAB
GLUCOSE BLDC GLUCOMTR-MCNC: 162 MG/DL — HIGH (ref 70–99)
GLUCOSE BLDC GLUCOMTR-MCNC: 165 MG/DL — HIGH (ref 70–99)
GLUCOSE BLDC GLUCOMTR-MCNC: 178 MG/DL — HIGH (ref 70–99)
GLUCOSE BLDC GLUCOMTR-MCNC: 190 MG/DL — HIGH (ref 70–99)
GLUCOSE BLDC GLUCOMTR-MCNC: 196 MG/DL — HIGH (ref 70–99)
GLUCOSE BLDC GLUCOMTR-MCNC: 211 MG/DL — HIGH (ref 70–99)

## 2019-10-14 PROCEDURE — 99232 SBSQ HOSP IP/OBS MODERATE 35: CPT

## 2019-10-14 PROCEDURE — 99223 1ST HOSP IP/OBS HIGH 75: CPT

## 2019-10-14 RX ORDER — INSULIN LISPRO 100/ML
VIAL (ML) SUBCUTANEOUS
Refills: 0 | Status: DISCONTINUED | OUTPATIENT
Start: 2019-10-14 | End: 2019-10-21

## 2019-10-14 RX ORDER — INSULIN LISPRO 100/ML
VIAL (ML) SUBCUTANEOUS AT BEDTIME
Refills: 0 | Status: DISCONTINUED | OUTPATIENT
Start: 2019-10-14 | End: 2019-10-21

## 2019-10-14 RX ORDER — POLYETHYLENE GLYCOL 3350 17 G/17G
17 POWDER, FOR SOLUTION ORAL DAILY
Refills: 0 | Status: DISCONTINUED | OUTPATIENT
Start: 2019-10-14 | End: 2019-10-21

## 2019-10-14 RX ADMIN — AMLODIPINE BESYLATE 10 MILLIGRAM(S): 2.5 TABLET ORAL at 05:02

## 2019-10-14 RX ADMIN — Medication 100 MILLIGRAM(S): at 13:41

## 2019-10-14 RX ADMIN — Medication 2: at 00:44

## 2019-10-14 RX ADMIN — SIMVASTATIN 40 MILLIGRAM(S): 20 TABLET, FILM COATED ORAL at 22:23

## 2019-10-14 RX ADMIN — SENNA PLUS 2 TABLET(S): 8.6 TABLET ORAL at 22:23

## 2019-10-14 RX ADMIN — Medication 4 MILLIGRAM(S): at 05:02

## 2019-10-14 RX ADMIN — Medication 500 MILLIGRAM(S): at 13:44

## 2019-10-14 RX ADMIN — Medication 100 MILLIGRAM(S): at 05:02

## 2019-10-14 RX ADMIN — Medication 500 MILLIGRAM(S): at 05:03

## 2019-10-14 RX ADMIN — MONTELUKAST 10 MILLIGRAM(S): 4 TABLET, CHEWABLE ORAL at 11:58

## 2019-10-14 RX ADMIN — Medication 500 MILLIGRAM(S): at 22:22

## 2019-10-14 RX ADMIN — POLYETHYLENE GLYCOL 3350 17 GRAM(S): 17 POWDER, FOR SOLUTION ORAL at 17:20

## 2019-10-14 RX ADMIN — Medication 2: at 17:36

## 2019-10-14 RX ADMIN — Medication 10 MILLIGRAM(S): at 05:02

## 2019-10-14 RX ADMIN — Medication 4 MILLIGRAM(S): at 13:41

## 2019-10-14 RX ADMIN — Medication 4 MILLIGRAM(S): at 22:23

## 2019-10-14 RX ADMIN — FAMOTIDINE 20 MILLIGRAM(S): 10 INJECTION INTRAVENOUS at 05:02

## 2019-10-14 RX ADMIN — ENOXAPARIN SODIUM 40 MILLIGRAM(S): 100 INJECTION SUBCUTANEOUS at 11:58

## 2019-10-14 RX ADMIN — Medication 2: at 08:49

## 2019-10-14 RX ADMIN — Medication 100 MILLIGRAM(S): at 22:23

## 2019-10-14 RX ADMIN — FAMOTIDINE 20 MILLIGRAM(S): 10 INJECTION INTRAVENOUS at 17:20

## 2019-10-14 RX ADMIN — Medication 2: at 12:54

## 2019-10-14 NOTE — PROGRESS NOTE ADULT - ASSESSMENT
59 Y F transferred from [place] for CT showing shift and vasogenic edema, and meningioma. Per tx paperwork pt is from NH has hx of CVA and HTN was sent in to OSH today for AMS. Per documentation unclear what her baseline MS is. At OSH pt had CTH done that showed exta-axial masses, likely meningiomas, there is mass effect on the left ventricle with 1.3 cm left to right midline shift. She was given Keppra 100 mh, decadron 12mg, was agitated, got haldol 5mg and plan was to do MRI however tx here for neurosurgery. Remainder of hx limited 2/2 patients MS and no family here at the moment     PLAN:  Neuro:   - MR brain- multiple brain lesions- likely meningiomas  - CT c/s/p- multiple spine lesions seen  - Dr Anyaa to speak to patient about surgical plan  - MR C-T-L spine w/wo contrast ordered  - continue VPA for seizure ppx  - continue decadron 4q8  Respiratory:   - encouraged incentive spirometry  - asthma- continue singulair, albuterol MDI prn  CV:  - HTN- continue amlodipine and enalapril  Endocrine:   - DMT2- continue fingersticks with SSC   DVT ppx:   - left calf DVT, serial dopplers on 10/17   PT/OT:   TBD    Assessment:  Please Check When Present   []  GCS  E   V  M     Heart Failure: []Acute, [] acute on chronic , []chronic  Heart Failure:  [] Diastolic (HFpEF), [] Systolic (HFrEF), []Combined (HFpEF and HFrEF), [] RHF, [] Pulm HTN, [] Other    [] GLENNA, [] ATN, [] AIN, [] other  [] CKD1, [] CKD2, [] CKD 3, [] CKD 4, [] CKD 5, []ESRD    Encephalopathy: [] Metabolic, [] Hepatic, [] toxic, [] Neurological, [] Other    Abnormal Nurtitional Status: [] malnurtition (see nutrition note), [ ]underweight: BMI < 19, [] morbid obesity: BMI >40, [] Cachexia    [] Sepsis  [] hypovolemic shock,[] cardiogenic shock, [] hemorrhagic shock, [] neuogenic shock  [] Acute Respiratory Failure  []Cerebral edema, [] Brain compression/ herniation,   [] Functional quadriplegia  [] Acute blood loss anemia    Spectralink # [id number]

## 2019-10-14 NOTE — PROGRESS NOTE ADULT - SUBJECTIVE AND OBJECTIVE BOX
SUBJECTIVE:     OVERNIGHT EVENTS:     Vital Signs Last 24 Hrs  T(C): 36.8 (14 Oct 2019 12:35), Max: 36.9 (13 Oct 2019 15:00)  T(F): 98.2 (14 Oct 2019 12:35), Max: 98.5 (13 Oct 2019 15:00)  HR: 65 (14 Oct 2019 12:35) (60 - 95)  BP: 124/79 (14 Oct 2019 12:35) (96/83 - 137/91)  BP(mean): 101 (13 Oct 2019 15:00) (101 - 101)  RR: 18 (14 Oct 2019 12:35) (16 - 19)  SpO2: 99% (14 Oct 2019 12:35) (83% - 99%)    PHYSICAL EXAM:    General: No Acute Distress     Neurological: Awake, alert oriented to person, place and time, Following Commands, PERRL, EOMI, Face Symmetrical, Speech Fluent, Moving all extremities, Muscle Strength normal in all four extremities, No Drift, Sensation to Light Touch Intact    Pulmonary: Clear to Auscultation, No Rales, No Rhonchi, No Wheezes     Cardiovascular: S1, S2, Regular Rate and Rhythm     Gastrointestinal: Soft, Nontender, Nondistended     Incision:     LABS:         Hemoglobin A1C, Whole Blood: 5.6 % (10-11 @ 05:14)      10-13 @ 07:01  -  10-14 @ 07:00  --------------------------------------------------------  IN: 290 mL / OUT: 3 mL / NET: 287 mL    10-14 @ 07:01  -  10-14 @ 12:53  --------------------------------------------------------  IN: 240 mL / OUT: 0 mL / NET: 240 mL      DRAINS:     MEDICATIONS:  Antibiotics:    Neuro:  valproic  acid Syrup 500 milliGRAM(s) Oral every 8 hours    Cardiac:  amLODIPine   Tablet 10 milliGRAM(s) Oral daily  enalapril 10 milliGRAM(s) Oral daily    Pulm:  ALBUTerol    90 MICROgram(s) HFA Inhaler 2 Puff(s) Inhalation every 6 hours PRN Bronchospasm  montelukast 10 milliGRAM(s) Oral daily    GI/:  docusate sodium 100 milliGRAM(s) Oral three times a day  famotidine    Tablet 20 milliGRAM(s) Oral two times a day  senna 2 Tablet(s) Oral at bedtime    Other:   dexamethasone  Injectable 4 milliGRAM(s) IV Push every 8 hours  enoxaparin Injectable 40 milliGRAM(s) SubCutaneous daily  insulin lispro (HumaLOG) corrective regimen sliding scale   SubCutaneous at bedtime  insulin lispro (HumaLOG) corrective regimen sliding scale   SubCutaneous three times a day with meals  simvastatin 40 milliGRAM(s) Oral at bedtime    DIET: [] Regular [] CCD [] Renal [] Puree [] Dysphagia [] Tube Feeds:     IMAGING: SUBJECTIVE:  Pt seen and examined, she reports headache, sitting up in chair at bedside, posey vest in place for impulsivity getting OOB, she reports she does not want to have surgery, she feels "she won't survive"    OVERNIGHT EVENTS: none    Vital Signs Last 24 Hrs  T(C): 36.8 (14 Oct 2019 12:35), Max: 36.9 (13 Oct 2019 15:00)  T(F): 98.2 (14 Oct 2019 12:35), Max: 98.5 (13 Oct 2019 15:00)  HR: 65 (14 Oct 2019 12:35) (60 - 95)  BP: 124/79 (14 Oct 2019 12:35) (96/83 - 137/91)  BP(mean): 101 (13 Oct 2019 15:00) (101 - 101)  RR: 18 (14 Oct 2019 12:35) (16 - 19)  SpO2: 99% (14 Oct 2019 12:35) (83% - 99%)    PHYSICAL EXAM:    General: No Acute Distress     Neurological: Awake, alert oriented to person, place and time, Following Commands, EOMI, Face Symmetrical, Speech Fluent, Moving all extremities, Muscle Strength normal in all four extremities, No Drift, Sensation to Light Touch Intact    Pulmonary: Clear to Auscultation, No Rales, No Rhonchi, No Wheezes     Cardiovascular: S1, S2, Regular Rate and Rhythm     Gastrointestinal: Soft, Nontender, Nondistended     Incision: none    LABS:         Hemoglobin A1C, Whole Blood: 5.6 % (10-11 @ 05:14)      10-13 @ 07:01  -  10-14 @ 07:00  --------------------------------------------------------  IN: 290 mL / OUT: 3 mL / NET: 287 mL    10-14 @ 07:01  -  10-14 @ 12:53  --------------------------------------------------------  IN: 240 mL / OUT: 0 mL / NET: 240 mL      DRAINS: none    MEDICATIONS:  Antibiotics:    Neuro:  valproic  acid Syrup 500 milliGRAM(s) Oral every 8 hours    Cardiac:  amLODIPine   Tablet 10 milliGRAM(s) Oral daily  enalapril 10 milliGRAM(s) Oral daily    Pulm:  ALBUTerol    90 MICROgram(s) HFA Inhaler 2 Puff(s) Inhalation every 6 hours PRN Bronchospasm  montelukast 10 milliGRAM(s) Oral daily    GI/:  docusate sodium 100 milliGRAM(s) Oral three times a day  famotidine    Tablet 20 milliGRAM(s) Oral two times a day  senna 2 Tablet(s) Oral at bedtime    Other:   dexamethasone  Injectable 4 milliGRAM(s) IV Push every 8 hours  enoxaparin Injectable 40 milliGRAM(s) SubCutaneous daily  insulin lispro (HumaLOG) corrective regimen sliding scale   SubCutaneous at bedtime  insulin lispro (HumaLOG) corrective regimen sliding scale   SubCutaneous three times a day with meals  simvastatin 40 milliGRAM(s) Oral at bedtime    DIET: [x] Regular [] CCD [] Renal [] Puree [] Dysphagia [] Tube Feeds:     IMAGING:   < from: MR Head w/wo IV Cont (10.11.19 @ 17:46) >  IMPRESSION:    Multiple avidly enhancing extra-axial masses in the supratentorial and   infratentorial regions as described, consistent with the presence of   multiple meningiomas. No vasogenic edema underlying these lesions.    Invasion of the left transverse sinus and superior sagittal sinus as   described.    Similar rightward midline shift of 1.2 cmon the basis of chronic right   MCA territory infarct with volume loss. No hydrocephalus or effacement of   basal cisterns.    No acute intracranial hemorrhage or acute territorial infarct.    < end of copied text >  < from: CT Abdomen and Pelvis w/ IV Cont (10.11.19 @ 10:37) >    IMPRESSION:       Multiple pelvic masses, likely adnexal in origin. Apparent supracervical   hysterectomy. Consider further evaluation with pelvic ultrasound.  Appearance suggesting multiple spinal meningiomata. Consider spinal MRI   as clinically indicated    < end of copied text >

## 2019-10-14 NOTE — CONSULT NOTE ADULT - SUBJECTIVE AND OBJECTIVE BOX
Vascular Cardiology Consult Note     [facility] [id number]              EMAIL [email or web address]   OFFICE [phone]    CC:  DVT    HPI:   59F HTN, DM, Prior IVC filter placement many years ago at [facility] (permanent filter).  Presented with decreased gait, was found to have meningioma, midline shift, cerebral edema.  Found to have DVT.  Asked to evaluate for management.  She does not have any chest pain, shortness of breath.  No leg swelling or calf pains.  She states that she would not like any type of surgery and that she is doing fine "with exercise and diet"    Allergies    phenobarbital (Unknown)    Intolerances    	    MEDICATIONS:  amLODIPine   Tablet 10 milliGRAM(s) Oral daily  enalapril 10 milliGRAM(s) Oral daily  enoxaparin Injectable 40 milliGRAM(s) SubCutaneous daily      ALBUTerol    90 MICROgram(s) HFA Inhaler 2 Puff(s) Inhalation every 6 hours PRN  montelukast 10 milliGRAM(s) Oral daily    valproic  acid Syrup 500 milliGRAM(s) Oral every 8 hours    docusate sodium 100 milliGRAM(s) Oral three times a day  famotidine    Tablet 20 milliGRAM(s) Oral two times a day  senna 2 Tablet(s) Oral at bedtime    dexamethasone  Injectable 4 milliGRAM(s) IV Push every 8 hours  insulin lispro (HumaLOG) corrective regimen sliding scale   SubCutaneous every 6 hours  insulin lispro (HumaLOG) corrective regimen sliding scale   SubCutaneous at bedtime  simvastatin 40 milliGRAM(s) Oral at bedtime        PAST MEDICAL & SURGICAL HISTORY:  DM (diabetes mellitus)  HTN (hypertension)  H/O ischemic right MCA stroke  COPD with asthma      FAMILY HISTORY:      SOCIAL HISTORY:  unchanged    REVIEW OF SYSTEMS:  CONSTITUTIONAL: No fever, weight loss, or fatigue  EYES: No eye pain, visual disturbances, or discharge  ENMT:  No difficulty hearing, tinnitus, vertigo; No sinus or throat pain  NECK: No pain or stiffness  RESPIRATORY:  No SOB  CARDIOVASCULAR:  No CP  GASTROINTESTINAL: No abdominal or epigastric pain. No nausea, vomiting, or hematemesis; No diarrhea or constipation. No melena or hematochezia.  GENITOURINARY: No dysuria, frequency, hematuria, or incontinence  NEUROLOGICAL: No headaches, memory loss, loss of strength, numbness, or tremors   LYMPH Nodes: No enlarged glands  ENDOCRINE: No heat or cold intolerance; No hair loss  MUSCULOSKELETAL: No joint pain or swelling; No muscle, back, or extremity pain  PSYCHIATRIC: No depression, anxiety, mood swings, or difficulty sleeping  HEME/LYMPH: No easy bruising, or bleeding gums  ALLERY AND IMMUNOLOGIC: No hives or eczema	    [ x] All others negative	  [ ] Unable to obtain    PHYSICAL EXAM:  T(C): 36.8 (10-14-19 @ 07:53), Max: 36.9 (10-13-19 @ 15:00)  HR: 60 (10-14-19 @ 07:53) (60 - 95)  BP: 136/83 (10-14-19 @ 07:53) (96/83 - 137/91)  RR: 18 (10-14-19 @ 07:53) (16 - 19)  SpO2: 97% (10-14-19 @ 07:53) (83% - 97%)  Wt(kg): --  I&O's Summary    13 Oct 2019 07:01  -  14 Oct 2019 07:00  --------------------------------------------------------  IN: 290 mL / OUT: 3 mL / NET: 287 mL    14 Oct 2019 07:01  -  14 Oct 2019 11:33  --------------------------------------------------------  IN: 240 mL / OUT: 0 mL / NET: 240 mL        Appearance:  	  HEENT:   Normal oral mucosa, PERRL, EOMI	   Cardiovascular:  S1S2 RRR  Respiratory:  	Clear  Psychiatry:  AAO x 3  Gastrointestinal:  Soft, Non-tender, + BS	  Skin: No rashes, No ecchymoses, No cyanosis	   Extremities:  No edema        Conclusions:  1. Increased relative wall thickness with normal left  ventricular mass index, consistent with concentric left  ventricular remodeling.  2. Hyperdynamic left ventricular systolic function.  Endocardial visualization enhanced with intravenous  injection of Ultrasonic Enhancing Agent (Definity).  3. The right ventricle is not well visualized; grossly  normal right ventricular systolic function.  4. Estimated right ventricular systolic pressure equals 21  mm Hg, assuming right atrial pressure equals 8 mm Hg,  consistent with normal pulmonary pressures.  *** No previous Echo exam.  ------------------------------------------------------------------------  Confirmed on  10/11/2019 - 15:20:02 by Davey Hernandez M.D.  ------------------------------------------------------------------------    IMPRESSION:     No evidence of deep venous thrombosis in the right lower extremity.    Positive deep venous thrombosis above the left knee within the left   popliteal vein.   Positive DVT is also noted within the left calf at the left gastrocnemius   vein.    COMPA English was informed 10/10/2019              Assessment:  1. DVT  2.  Presence of IVC filter  3.  Brain Mass  4.  Multiple Pelvic massess           Plan:  1. She already has an IVC filter  2.  Needs further malignancy workup  3.  Lovenox 40mg daily for now given risk of ICH cannot use full dose a/c  4.  Continue enalapril and amlodipine at current dose  5.  Will follow         Thank you    Jose C Corrales     Vascular Cardiology Service    Please call with any questions:   SPECT[facility] - [id number]  Office [phone]  email:  [email or web address]

## 2019-10-15 DIAGNOSIS — E11.9 TYPE 2 DIABETES MELLITUS WITHOUT COMPLICATIONS: ICD-10-CM

## 2019-10-15 DIAGNOSIS — J44.9 CHRONIC OBSTRUCTIVE PULMONARY DISEASE, UNSPECIFIED: ICD-10-CM

## 2019-10-15 DIAGNOSIS — Z86.73 PERSONAL HISTORY OF TRANSIENT ISCHEMIC ATTACK (TIA), AND CEREBRAL INFARCTION WITHOUT RESIDUAL DEFICITS: ICD-10-CM

## 2019-10-15 DIAGNOSIS — I10 ESSENTIAL (PRIMARY) HYPERTENSION: ICD-10-CM

## 2019-10-15 DIAGNOSIS — Z29.9 ENCOUNTER FOR PROPHYLACTIC MEASURES, UNSPECIFIED: ICD-10-CM

## 2019-10-15 LAB
CULTURE RESULTS: SIGNIFICANT CHANGE UP
CULTURE RESULTS: SIGNIFICANT CHANGE UP
GLUCOSE BLDC GLUCOMTR-MCNC: 167 MG/DL — HIGH (ref 70–99)
GLUCOSE BLDC GLUCOMTR-MCNC: 176 MG/DL — HIGH (ref 70–99)
GLUCOSE BLDC GLUCOMTR-MCNC: 227 MG/DL — HIGH (ref 70–99)
GLUCOSE BLDC GLUCOMTR-MCNC: 233 MG/DL — HIGH (ref 70–99)
SPECIMEN SOURCE: SIGNIFICANT CHANGE UP
SPECIMEN SOURCE: SIGNIFICANT CHANGE UP

## 2019-10-15 PROCEDURE — 99231 SBSQ HOSP IP/OBS SF/LOW 25: CPT

## 2019-10-15 PROCEDURE — 99233 SBSQ HOSP IP/OBS HIGH 50: CPT

## 2019-10-15 RX ADMIN — FAMOTIDINE 20 MILLIGRAM(S): 10 INJECTION INTRAVENOUS at 05:29

## 2019-10-15 RX ADMIN — Medication 500 MILLIGRAM(S): at 05:28

## 2019-10-15 RX ADMIN — SIMVASTATIN 40 MILLIGRAM(S): 20 TABLET, FILM COATED ORAL at 22:38

## 2019-10-15 RX ADMIN — FAMOTIDINE 20 MILLIGRAM(S): 10 INJECTION INTRAVENOUS at 18:24

## 2019-10-15 RX ADMIN — Medication 4 MILLIGRAM(S): at 22:38

## 2019-10-15 RX ADMIN — MONTELUKAST 10 MILLIGRAM(S): 4 TABLET, CHEWABLE ORAL at 13:33

## 2019-10-15 RX ADMIN — Medication 2: at 08:57

## 2019-10-15 RX ADMIN — POLYETHYLENE GLYCOL 3350 17 GRAM(S): 17 POWDER, FOR SOLUTION ORAL at 13:32

## 2019-10-15 RX ADMIN — ENOXAPARIN SODIUM 40 MILLIGRAM(S): 100 INJECTION SUBCUTANEOUS at 13:32

## 2019-10-15 RX ADMIN — Medication 500 MILLIGRAM(S): at 22:37

## 2019-10-15 RX ADMIN — SENNA PLUS 2 TABLET(S): 8.6 TABLET ORAL at 22:38

## 2019-10-15 RX ADMIN — Medication 500 MILLIGRAM(S): at 13:34

## 2019-10-15 RX ADMIN — Medication 10 MILLIGRAM(S): at 05:29

## 2019-10-15 RX ADMIN — Medication 4 MILLIGRAM(S): at 13:33

## 2019-10-15 RX ADMIN — Medication 4 MILLIGRAM(S): at 05:29

## 2019-10-15 RX ADMIN — Medication 100 MILLIGRAM(S): at 22:38

## 2019-10-15 RX ADMIN — Medication 100 MILLIGRAM(S): at 05:29

## 2019-10-15 RX ADMIN — AMLODIPINE BESYLATE 10 MILLIGRAM(S): 2.5 TABLET ORAL at 05:29

## 2019-10-15 RX ADMIN — Medication 100 MILLIGRAM(S): at 13:34

## 2019-10-15 NOTE — PROGRESS NOTE ADULT - SUBJECTIVE AND OBJECTIVE BOX
Vascular Cardiology Consult Note     [facility] [id number]              EMAIL [email or web address]   OFFICE [phone]    CC:  DVT     Doing ok.  Legs feel ok.  No CP or SOB>      Allergies    phenobarbital (Unknown)    Intolerances    	MEDICATIONS  (STANDING):  amLODIPine   Tablet 10 milliGRAM(s) Oral daily  dexamethasone  Injectable 4 milliGRAM(s) IV Push every 8 hours  docusate sodium 100 milliGRAM(s) Oral three times a day  enalapril 10 milliGRAM(s) Oral daily  enoxaparin Injectable 40 milliGRAM(s) SubCutaneous daily  famotidine    Tablet 20 milliGRAM(s) Oral two times a day  insulin lispro (HumaLOG) corrective regimen sliding scale   SubCutaneous at bedtime  insulin lispro (HumaLOG) corrective regimen sliding scale   SubCutaneous three times a day with meals  montelukast 10 milliGRAM(s) Oral daily  polyethylene glycol 3350 17 Gram(s) Oral daily  senna 2 Tablet(s) Oral at bedtime  simvastatin 40 milliGRAM(s) Oral at bedtime  valproic  acid Syrup 500 milliGRAM(s) Oral every 8 hours    PAST MEDICAL & SURGICAL HISTORY:  DM (diabetes mellitus)  HTN (hypertension)  H/O ischemic right MCA stroke  COPD with asthma      FAMILY HISTORY:      SOCIAL HISTORY:  unchanged    REVIEW OF SYSTEMS:  CONSTITUTIONAL: No fever, weight loss, or fatigue  EYES: No eye pain, visual disturbances, or discharge  ENMT:  No difficulty hearing, tinnitus, vertigo; No sinus or throat pain  NECK: No pain or stiffness  RESPIRATORY:  No SOB  CARDIOVASCULAR:  No CP  GASTROINTESTINAL: No abdominal or epigastric pain. No nausea, vomiting, or hematemesis; No diarrhea or constipation. No melena or hematochezia.  GENITOURINARY: No dysuria, frequency, hematuria, or incontinence  NEUROLOGICAL: No headaches, memory loss, loss of strength, numbness, or tremors   LYMPH Nodes: No enlarged glands  ENDOCRINE: No heat or cold intolerance; No hair loss  MUSCULOSKELETAL: No joint pain or swelling; No muscle, back, or extremity pain  PSYCHIATRIC: No depression, anxiety, mood swings, or difficulty sleeping  HEME/LYMPH: No easy bruising, or bleeding gums  ALLERY AND IMMUNOLOGIC: No hives or eczema	    [ x] All others negative	  [ ] Unable to obtain    ICU Vital Signs Last 24 Hrs  T(C): 37 (15 Oct 2019 08:00), Max: 37 (15 Oct 2019 08:00)  T(F): 98.6 (15 Oct 2019 08:00), Max: 98.6 (15 Oct 2019 08:00)  HR: 65 (15 Oct 2019 08:00) (50 - 67)  BP: 114/79 (15 Oct 2019 08:00) (110/63 - 138/77)  BP(mean): --  ABP: --  ABP(mean): --  RR: 19 (15 Oct 2019 08:00) (18 - 19)  SpO2: 95% (15 Oct 2019 08:00) (95% - 97%)        Appearance:  	  HEENT:   Normal oral mucosa, PERRL, EOMI	   Cardiovascular:  S1S2 RRR  Respiratory:  	Clear  Psychiatry:  AAO x 3  Gastrointestinal:  Soft, Non-tender, + BS	  Skin: No rashes, No ecchymoses, No cyanosis	   Extremities:  No edema     < from: CT Abdomen and Pelvis w/ IV Cont (10.11.19 @ 10:37) >  IMPRESSION:       Multiple pelvic masses, likely adnexal in origin. Apparent supracervical   hysterectomy. Consider further evaluation with pelvic ultrasound.  Appearance suggesting multiple spinal meningiomata. Consider spinal MRI   as clinically indicated    < end of copied text >     Conclusions:  1. Increased relative wall thickness with normal left  ventricular mass index, consistent with concentric left  ventricular remodeling.  2. Hyperdynamic left ventricular systolic function.  Endocardial visualization enhanced with intravenous  injection of Ultrasonic Enhancing Agent (Definity).  3. The right ventricle is not well visualized; grossly  normal right ventricular systolic function.  4. Estimated right ventricular systolic pressure equals 21  mm Hg, assuming right atrial pressure equals 8 mm Hg,  consistent with normal pulmonary pressures.  *** No previous Echo exam.  ------------------------------------------------------------------------  Confirmed on  10/11/2019 - 15:20:02 by Davey Hernandez M.D.  ------------------------------------------------------------------------    IMPRESSION:     No evidence of deep venous thrombosis in the right lower extremity.    Positive deep venous thrombosis above the left knee within the left   popliteal vein.   Positive DVT is also noted within the left calf at the left gastrocnemius   vein.    COMPA English was informed 10/10/2019              Assessment:  1. DVT  2.  Presence of IVC filter  3.  Brain Mass  4.  Multiple Pelvic massess           Plan:  1. She already has an IVC filter  2.  Needs further malignancy workup, pelvic ultrasound, oncology evaluation   3.  Lovenox 40mg daily for now given risk of ICH cannot use full dose a/c  4.  Continue enalapril and amlodipine at current dose  5.  Will follow         Thank you    Jose C Corrales     Vascular Cardiology Service    Please call with any questions:   S[facility] - [id number]  Office [phone]  email:  [email or web address]

## 2019-10-15 NOTE — PROGRESS NOTE ADULT - ASSESSMENT
59 Y F transferred from [place] for CT showing shift and vasogenic edema, and meningioma. Per tx paperwork pt is from NH has hx of CVA and HTN was sent in to OSH today for AMS. Per documentation unclear what her baseline MS is. At OSH pt had CTH done that showed exta-axial masses, likely meningiomas, there is mass effect on the left ventricle with 1.3 cm left to right midline shift. She was given Keppra 100 mh, decadron 12mg, was agitated, got haldol 5mg and plan was to do MRI however tx here for neurosurgery. Remainder of hx limited 2/2 patients MS and no family here at the moment   Please Check When Present   []  GCS  E   V  M     Heart Failure: []Acute, [] acute on chronic , []chronic  Heart Failure:  [] Diastolic (HFpEF), [] Systolic (HFrEF), []Combined (HFpEF and HFrEF), [] RHF, [] Pulm HTN, [] Other    [] GLENNA, [] ATN, [] AIN, [] other  [] CKD1, [] CKD2, [] CKD 3, [] CKD 4, [] CKD 5, []ESRD    Encephalopathy: [] Metabolic, [] Hepatic, [] toxic, [] Neurological, [] Other    Abnormal Nurtitional Status: [] malnurtition (see nutrition note), [ ]underweight: BMI < 19, [] morbid obesity: BMI >40, [] Cachexia    [] Sepsis  [] hypovolemic shock,[] cardiogenic shock, [] hemorrhagic shock, [] neuogenic shock  [] Acute Respiratory Failure  []Cerebral edema, [] Brain compression/ herniation,   [] Functional quadriplegia  [] Acute blood loss anemia

## 2019-10-15 NOTE — PROGRESS NOTE ADULT - SUBJECTIVE AND OBJECTIVE BOX
59 Y F transferred from [place] for CT showing shift and vasogenic edema, and meningioma. Per tx paperwork pt is from NH has hx of CVA and HTN was sent in to OSH today for AMS. Per documentation unclear what her baseline MS is. At OSH pt had CTH done that showed exta-axial masses, likely meningiomas, there is mass effect on the left ventricle with 1.3 cm left to right midline shift. She was given Keppra 100 mh, decadron 12mg, was agitated, got haldol 5mg and plan was to do MRI however tx here for neurosurgery. Remainder of hx limited 2/2 patients MS and no family here at the moment (10 Oct 2019 01:22)    Overnight event: none    Vital Signs Last 24 Hrs  T(C): 37 (15 Oct 2019 08:00), Max: 37 (15 Oct 2019 08:00)  T(F): 98.6 (15 Oct 2019 08:00), Max: 98.6 (15 Oct 2019 08:00)  HR: 65 (15 Oct 2019 08:00) (50 - 67)  BP: 114/79 (15 Oct 2019 08:00) (110/63 - 138/77)  BP(mean): --  RR: 19 (15 Oct 2019 08:00) (18 - 19)  SpO2: 95% (15 Oct 2019 08:00) (95% - 99%)      Stroke Core Measures   Hemoglobin A1C, Whole Blood: 5.6 % (10-11 @ 05:14)    DRAIN OUTPUT:     NEUROIMAGING:     PHYSICAL EXAM:    General: No Acute Distress     Neurological: Awake, alert oriented to person, place and time, Following Commands, PERRL, EOMI, Face Symmetrical, Speech Fluent, Moving all extremities, Muscle Strength normal , has LUE drift, Sensation to Light Touch Intact    Pulmonary: Clear to Auscultation, No Rales, No Rhonchi, No Wheezes     Cardiovascular: S1, S2, Regular Rate and Rhythm     Gastrointestinal: Soft, Nontender, Nondistended     Incision:     MEDICATIONS:   Antibiotics:    Neuro:  valproic  acid Syrup 500 milliGRAM(s) Oral every 8 hours    Anticoagulation:  enoxaparin Injectable 40 milliGRAM(s) SubCutaneous daily    Cardiology:  amLODIPine   Tablet 10 milliGRAM(s) Oral daily  enalapril 10 milliGRAM(s) Oral daily    Endo:   dexamethasone  Injectable 4 milliGRAM(s) IV Push every 8 hours  insulin lispro (HumaLOG) corrective regimen sliding scale   SubCutaneous at bedtime  insulin lispro (HumaLOG) corrective regimen sliding scale   SubCutaneous three times a day with meals  simvastatin 40 milliGRAM(s) Oral at bedtime    Pulm:  ALBUTerol    90 MICROgram(s) HFA Inhaler 2 Puff(s) Inhalation every 6 hours PRN  montelukast 10 milliGRAM(s) Oral daily    GI/:  docusate sodium 100 milliGRAM(s) Oral three times a day  famotidine    Tablet 20 milliGRAM(s) Oral two times a day  polyethylene glycol 3350 17 Gram(s) Oral daily  senna 2 Tablet(s) Oral at bedtime    Other:

## 2019-10-16 LAB
ANION GAP SERPL CALC-SCNC: 12 MMOL/L — SIGNIFICANT CHANGE UP (ref 5–17)
BUN SERPL-MCNC: 20 MG/DL — SIGNIFICANT CHANGE UP (ref 7–23)
CALCIUM SERPL-MCNC: 9.4 MG/DL — SIGNIFICANT CHANGE UP (ref 8.4–10.5)
CHLORIDE SERPL-SCNC: 99 MMOL/L — SIGNIFICANT CHANGE UP (ref 96–108)
CO2 SERPL-SCNC: 26 MMOL/L — SIGNIFICANT CHANGE UP (ref 22–31)
CREAT SERPL-MCNC: 0.69 MG/DL — SIGNIFICANT CHANGE UP (ref 0.5–1.3)
GLUCOSE BLDC GLUCOMTR-MCNC: 159 MG/DL — HIGH (ref 70–99)
GLUCOSE BLDC GLUCOMTR-MCNC: 199 MG/DL — HIGH (ref 70–99)
GLUCOSE BLDC GLUCOMTR-MCNC: 213 MG/DL — HIGH (ref 70–99)
GLUCOSE BLDC GLUCOMTR-MCNC: 215 MG/DL — HIGH (ref 70–99)
GLUCOSE SERPL-MCNC: 257 MG/DL — HIGH (ref 70–99)
HCT VFR BLD CALC: 50.5 % — HIGH (ref 34.5–45)
HGB BLD-MCNC: 17 G/DL — HIGH (ref 11.5–15.5)
MCHC RBC-ENTMCNC: 29.7 PG — SIGNIFICANT CHANGE UP (ref 27–34)
MCHC RBC-ENTMCNC: 33.7 GM/DL — SIGNIFICANT CHANGE UP (ref 32–36)
MCV RBC AUTO: 88.3 FL — SIGNIFICANT CHANGE UP (ref 80–100)
PLATELET # BLD AUTO: 196 K/UL — SIGNIFICANT CHANGE UP (ref 150–400)
POTASSIUM SERPL-MCNC: 4.8 MMOL/L — SIGNIFICANT CHANGE UP (ref 3.5–5.3)
POTASSIUM SERPL-SCNC: 4.8 MMOL/L — SIGNIFICANT CHANGE UP (ref 3.5–5.3)
RBC # BLD: 5.72 M/UL — HIGH (ref 3.8–5.2)
RBC # FLD: 12.1 % — SIGNIFICANT CHANGE UP (ref 10.3–14.5)
SODIUM SERPL-SCNC: 137 MMOL/L — SIGNIFICANT CHANGE UP (ref 135–145)
VALPROATE SERPL-MCNC: 80 UG/ML — SIGNIFICANT CHANGE UP (ref 50–100)
WBC # BLD: 8.67 K/UL — SIGNIFICANT CHANGE UP (ref 3.8–10.5)
WBC # FLD AUTO: 8.67 K/UL — SIGNIFICANT CHANGE UP (ref 3.8–10.5)

## 2019-10-16 PROCEDURE — 99232 SBSQ HOSP IP/OBS MODERATE 35: CPT

## 2019-10-16 PROCEDURE — 99231 SBSQ HOSP IP/OBS SF/LOW 25: CPT

## 2019-10-16 PROCEDURE — 99223 1ST HOSP IP/OBS HIGH 75: CPT

## 2019-10-16 RX ORDER — DEXAMETHASONE 0.5 MG/5ML
3 ELIXIR ORAL THREE TIMES A DAY
Refills: 0 | Status: DISCONTINUED | OUTPATIENT
Start: 2019-10-16 | End: 2019-10-21

## 2019-10-16 RX ADMIN — Medication 2: at 18:07

## 2019-10-16 RX ADMIN — Medication 500 MILLIGRAM(S): at 13:01

## 2019-10-16 RX ADMIN — Medication 10 MILLIGRAM(S): at 05:42

## 2019-10-16 RX ADMIN — ENOXAPARIN SODIUM 40 MILLIGRAM(S): 100 INJECTION SUBCUTANEOUS at 13:01

## 2019-10-16 RX ADMIN — MONTELUKAST 10 MILLIGRAM(S): 4 TABLET, CHEWABLE ORAL at 13:01

## 2019-10-16 RX ADMIN — FAMOTIDINE 20 MILLIGRAM(S): 10 INJECTION INTRAVENOUS at 05:42

## 2019-10-16 RX ADMIN — Medication 4: at 13:02

## 2019-10-16 RX ADMIN — SIMVASTATIN 40 MILLIGRAM(S): 20 TABLET, FILM COATED ORAL at 22:10

## 2019-10-16 RX ADMIN — Medication 3 MILLIGRAM(S): at 13:01

## 2019-10-16 RX ADMIN — Medication 2: at 09:02

## 2019-10-16 RX ADMIN — AMLODIPINE BESYLATE 10 MILLIGRAM(S): 2.5 TABLET ORAL at 05:42

## 2019-10-16 RX ADMIN — Medication 4 MILLIGRAM(S): at 05:42

## 2019-10-16 RX ADMIN — Medication 500 MILLIGRAM(S): at 05:48

## 2019-10-16 RX ADMIN — Medication 500 MILLIGRAM(S): at 22:11

## 2019-10-16 RX ADMIN — FAMOTIDINE 20 MILLIGRAM(S): 10 INJECTION INTRAVENOUS at 18:07

## 2019-10-16 RX ADMIN — Medication 3 MILLIGRAM(S): at 22:10

## 2019-10-16 NOTE — PROGRESS NOTE ADULT - ASSESSMENT
59 Y F transferred from [place] for CT head showing shift and vasogenic edema, and meningioma. Per tx paperwork pt is from NH has hx of CVA and HTN, asthma, DVT s/p IVCF was sent in to OSH today for AMS. Per documentation unclear what her baseline MS is. At OSH pt had CTH done that showed exta-axial masses, likely meningiomas, there is mass effect on the left ventricle with 1.3 cm left to right midline shift. She was given Keppra 100 mh, decadron 12mg, was agitated, got haldol 5mg and plan was to do MRI however tx here for neurosurgery. Remainder of hx limited 2/2 patients MS and no family here at the moment     PLAN:  Neuro:   - MR brain- multiple brain lesions- likely meningiomas  - CT c/s/p- multiple spine lesions seen  - pt refusing surgery after speaking with Dr Anaya, patients son at bedside and in agreement with his mother  - refusing MR C-T-L spine   - continue VPA for seizure ppx  - continue decadron 3q8  Respiratory:   - encouraged incentive spirometry  - asthma- continue singulair, albuterol MDI prn  CV:  - HTN- continue amlodipine and enalapril  Endocrine:   - DMT2- continue fingersticks with SSC   DVT ppx:   - left calf DVT, serial dopplers on 10/17   - pt has IVCF  PT/OT:   HEATHER    Assessment:  Please Check When Present   []  GCS  E   V  M     Heart Failure: []Acute, [] acute on chronic , []chronic  Heart Failure:  [] Diastolic (HFpEF), [] Systolic (HFrEF), []Combined (HFpEF and HFrEF), [] RHF, [] Pulm HTN, [] Other    [] GLENNA, [] ATN, [] AIN, [] other  [] CKD1, [] CKD2, [] CKD 3, [] CKD 4, [] CKD 5, []ESRD    Encephalopathy: [] Metabolic, [] Hepatic, [] toxic, [] Neurological, [] Other    Abnormal Nurtitional Status: [] malnurtition (see nutrition note), [ ]underweight: BMI < 19, [] morbid obesity: BMI >40, [] Cachexia    [] Sepsis  [] hypovolemic shock,[] cardiogenic shock, [] hemorrhagic shock, [] neuogenic shock  [] Acute Respiratory Failure  []Cerebral edema, [] Brain compression/ herniation,   [] Functional quadriplegia  [] Acute blood loss anemia    Spectralink # [id number]

## 2019-10-16 NOTE — PROGRESS NOTE ADULT - SUBJECTIVE AND OBJECTIVE BOX
Vascular Cardiology Consult Note     [facility] [id number]              EMAIL [email or web address]   OFFICE [phone]    CC:  DVT     Doing ok.  Legs feel ok.  No CP or SOB .  She does not want any testing or intervention for the mass in the abdomen.     Allergies    phenobarbital (Unknown)     MEDICATIONS  (STANDING):  amLODIPine   Tablet 10 milliGRAM(s) Oral daily  dexamethasone     Tablet 3 milliGRAM(s) Oral three times a day  docusate sodium 100 milliGRAM(s) Oral three times a day  enalapril 10 milliGRAM(s) Oral daily  enoxaparin Injectable 40 milliGRAM(s) SubCutaneous daily  famotidine    Tablet 20 milliGRAM(s) Oral two times a day  insulin lispro (HumaLOG) corrective regimen sliding scale   SubCutaneous at bedtime  insulin lispro (HumaLOG) corrective regimen sliding scale   SubCutaneous three times a day with meals  montelukast 10 milliGRAM(s) Oral daily  polyethylene glycol 3350 17 Gram(s) Oral daily  senna 2 Tablet(s) Oral at bedtime  simvastatin 40 milliGRAM(s) Oral at bedtime  valproic  acid Syrup 500 milliGRAM(s) Oral every 8 hours      PAST MEDICAL & SURGICAL HISTORY:  DM (diabetes mellitus)  HTN (hypertension)  H/O ischemic right MCA stroke  COPD with asthma      FAMILY HISTORY:      SOCIAL HISTORY:  unchanged    REVIEW OF SYSTEMS:  CONSTITUTIONAL: No fever, weight loss, or fatigue  EYES: No eye pain, visual disturbances, or discharge  ENMT:  No difficulty hearing, tinnitus, vertigo; No sinus or throat pain  NECK: No pain or stiffness  RESPIRATORY:  No SOB  CARDIOVASCULAR:  No CP  GASTROINTESTINAL: No abdominal or epigastric pain. No nausea, vomiting, or hematemesis; No diarrhea or constipation. No melena or hematochezia.  GENITOURINARY: No dysuria, frequency, hematuria, or incontinence  NEUROLOGICAL: No headaches, memory loss, loss of strength, numbness, or tremors   LYMPH Nodes: No enlarged glands  ENDOCRINE: No heat or cold intolerance; No hair loss  MUSCULOSKELETAL: No joint pain or swelling; No muscle, back, or extremity pain  PSYCHIATRIC: No depression, anxiety, mood swings, or difficulty sleeping  HEME/LYMPH: No easy bruising, or bleeding gums  ALLERY AND IMMUNOLOGIC: No hives or eczema	    [ x] All others negative	  [ ] Unable to obtain     ICU Vital Signs Last 24 Hrs  T(C): 36.9 (16 Oct 2019 16:00), Max: 36.9 (15 Oct 2019 20:01)  T(F): 98.4 (16 Oct 2019 16:00), Max: 98.5 (15 Oct 2019 20:01)  HR: 64 (16 Oct 2019 16:00) (54 - 64)  BP: 119/86 (16 Oct 2019 16:00) (102/67 - 142/95)  BP(mean): --  ABP: --  ABP(mean): --  RR: 18 (16 Oct 2019 16:00) (18 - 19)  SpO2: 96% (16 Oct 2019 16:00) (96% - 99%)      Appearance:  	  HEENT:   Normal oral mucosa, PERRL, EOMI	   Cardiovascular:  S1S2 RRR  Respiratory:  	Clear  Psychiatry:  AAO x 3  Gastrointestinal:  Soft, Non-tender, + BS	  Skin: No rashes, No ecchymoses, No cyanosis	   Extremities:  No edema     < from: CT Abdomen and Pelvis w/ IV Cont (10.11.19 @ 10:37) >  IMPRESSION:       Multiple pelvic masses, likely adnexal in origin. Apparent supracervical   hysterectomy. Consider further evaluation with pelvic ultrasound.  Appearance suggesting multiple spinal meningiomata. Consider spinal MRI   as clinically indicated    < end of copied text >     Conclusions:  1. Increased relative wall thickness with normal left  ventricular mass index, consistent with concentric left  ventricular remodeling.  2. Hyperdynamic left ventricular systolic function.  Endocardial visualization enhanced with intravenous  injection of Ultrasonic Enhancing Agent (Definity).  3. The right ventricle is not well visualized; grossly  normal right ventricular systolic function.  4. Estimated right ventricular systolic pressure equals 21  mm Hg, assuming right atrial pressure equals 8 mm Hg,  consistent with normal pulmonary pressures.  *** No previous Echo exam.  ------------------------------------------------------------------------  Confirmed on  10/11/2019 - 15:20:02 by Davey Hernandez M.D.  ------------------------------------------------------------------------    IMPRESSION:     No evidence of deep venous thrombosis in the right lower extremity.    Positive deep venous thrombosis above the left knee within the left   popliteal vein.   Positive DVT is also noted within the left calf at the left gastrocnemius   vein.    COMPA English was informed 10/10/2019                            17.0   8.67  )-----------( 196      ( 16 Oct 2019 09:59 )             50.5   10-16    137  |  99  |  20  ----------------------------<  257<H>  4.8   |  26  |  0.69    Ca    9.4      16 Oct 2019 06:54            Assessment:  1. DVT  2.  Presence of IVC filter  3.  Brain Mass  4.  Multiple Pelvic massess           Plan:  1.  She already has an IVC filter  2.  Needs further malignancy workup, pelvic ultrasound, oncology evaluation - however patient is not interested in this  3.  Lovenox 40mg daily while inpatient given risk of ICH cannot use full dose a/c  4.  Continue enalapril and amlodipine at current dose  5.  Will follow         Thank you    Jose C Corrales     Vascular Cardiology Service    Please call with any questions:   SPECT[facility] - [id number]  Office [phone]  email:  [email or web address]

## 2019-10-16 NOTE — PROGRESS NOTE ADULT - SUBJECTIVE AND OBJECTIVE BOX
SUBJECTIVE: Pt seen and examined, she does not want surgery, awaiting discharge to [facility]    OVERNIGHT EVENTS: none    Vital Signs Last 24 Hrs  T(C): 36.4 (16 Oct 2019 12:52), Max: 37 (15 Oct 2019 15:25)  T(F): 97.6 (16 Oct 2019 12:52), Max: 98.6 (15 Oct 2019 15:25)  HR: 59 (16 Oct 2019 12:52) (54 - 62)  BP: 102/67 (16 Oct 2019 12:52) (102/67 - 142/95)  BP(mean): --  RR: 19 (16 Oct 2019 12:52) (18 - 19)  SpO2: 97% (16 Oct 2019 12:52) (97% - 99%)    PHYSICAL EXAM:    General: No Acute Distress     Neurological: Awake, alert oriented to person, place and time, Following Commands, PERRL, EOMI, Face Symmetrical, Speech Fluent, Moving all extremities, Muscle Strength normal in all four extremities, slight LUE Drift, Sensation to Light Touch Intact    Pulmonary: Clear to Auscultation, No Rales, No Rhonchi, No Wheezes     Cardiovascular: S1, S2, Regular Rate and Rhythm     Gastrointestinal: Soft, Nontender, Nondistended     Incision: none    LABS:                        17.0   8.67  )-----------( 196      ( 16 Oct 2019 09:59 )             50.5    10-16    137  |  99  |  20  ----------------------------<  257<H>  4.8   |  26  |  0.69    Ca    9.4      16 Oct 2019 06:54      Hemoglobin A1C, Whole Blood: 5.6 % (10-11 @ 05:14)      10-15 @ 07:01  -  10-16 @ 07:00  --------------------------------------------------------  IN: 360 mL / OUT: 0 mL / NET: 360 mL    10-16 @ 07:01  -  10-16 @ 13:01  --------------------------------------------------------  IN: 480 mL / OUT: 0 mL / NET: 480 mL      DRAINS: none    MEDICATIONS:  Antibiotics:    Neuro:  valproic  acid Syrup 500 milliGRAM(s) Oral every 8 hours    Cardiac:  amLODIPine   Tablet 10 milliGRAM(s) Oral daily  enalapril 10 milliGRAM(s) Oral daily    Pulm:  ALBUTerol    90 MICROgram(s) HFA Inhaler 2 Puff(s) Inhalation every 6 hours PRN Bronchospasm  montelukast 10 milliGRAM(s) Oral daily    GI/:  docusate sodium 100 milliGRAM(s) Oral three times a day  famotidine    Tablet 20 milliGRAM(s) Oral two times a day  polyethylene glycol 3350 17 Gram(s) Oral daily  senna 2 Tablet(s) Oral at bedtime    Other:   dexamethasone     Tablet 3 milliGRAM(s) Oral three times a day  enoxaparin Injectable 40 milliGRAM(s) SubCutaneous daily  insulin lispro (HumaLOG) corrective regimen sliding scale   SubCutaneous at bedtime  insulin lispro (HumaLOG) corrective regimen sliding scale   SubCutaneous three times a day with meals  simvastatin 40 milliGRAM(s) Oral at bedtime    DIET: [x] Regular [] CCD [] Renal [] Puree [] Dysphagia [] Tube Feeds:     IMAGING:   < from: MR Head w/wo IV Cont (10.11.19 @ 17:46) >  IMPRESSION:    Multiple avidly enhancing extra-axial masses in the supratentorial and   infratentorial regions as described, consistent with the presence of   multiple meningiomas. No vasogenic edema underlying these lesions.    Invasion of the left transverse sinus and superior sagittal sinus as   described.    Similar rightward midline shift of 1.2 cmon the basis of chronic right   MCA territory infarct with volume loss. No hydrocephalus or effacement of   basal cisterns.    No acute intracranial hemorrhage or acute territorial infarct.    < end of copied text >  < from: CT Abdomen and Pelvis w/ IV Cont (10.11.19 @ 10:37) >    IMPRESSION:       Multiple pelvic masses, likely adnexal in origin. Apparent supracervical   hysterectomy. Consider further evaluation with pelvic ultrasound.  Appearance suggesting multiple spinal meningiomata. Consider spinal MRI   as clinically indicated    < end of copied text >

## 2019-10-17 DIAGNOSIS — I82.409 ACUTE EMBOLISM AND THROMBOSIS OF UNSPECIFIED DEEP VEINS OF UNSPECIFIED LOWER EXTREMITY: ICD-10-CM

## 2019-10-17 DIAGNOSIS — G93.9 DISORDER OF BRAIN, UNSPECIFIED: ICD-10-CM

## 2019-10-17 DIAGNOSIS — R73.9 HYPERGLYCEMIA, UNSPECIFIED: ICD-10-CM

## 2019-10-17 LAB
GLUCOSE BLDC GLUCOMTR-MCNC: 134 MG/DL — HIGH (ref 70–99)
GLUCOSE BLDC GLUCOMTR-MCNC: 221 MG/DL — HIGH (ref 70–99)
GLUCOSE BLDC GLUCOMTR-MCNC: 260 MG/DL — HIGH (ref 70–99)
GLUCOSE BLDC GLUCOMTR-MCNC: 313 MG/DL — HIGH (ref 70–99)

## 2019-10-17 PROCEDURE — 99233 SBSQ HOSP IP/OBS HIGH 50: CPT

## 2019-10-17 PROCEDURE — 99232 SBSQ HOSP IP/OBS MODERATE 35: CPT

## 2019-10-17 RX ORDER — INSULIN GLARGINE 100 [IU]/ML
10 INJECTION, SOLUTION SUBCUTANEOUS AT BEDTIME
Refills: 0 | Status: DISCONTINUED | OUTPATIENT
Start: 2019-10-17 | End: 2019-10-18

## 2019-10-17 RX ORDER — ACETAMINOPHEN 500 MG
650 TABLET ORAL EVERY 6 HOURS
Refills: 0 | Status: DISCONTINUED | OUTPATIENT
Start: 2019-10-17 | End: 2019-10-21

## 2019-10-17 RX ADMIN — Medication 3 MILLIGRAM(S): at 21:36

## 2019-10-17 RX ADMIN — SIMVASTATIN 40 MILLIGRAM(S): 20 TABLET, FILM COATED ORAL at 21:39

## 2019-10-17 RX ADMIN — Medication 6: at 12:50

## 2019-10-17 RX ADMIN — ENOXAPARIN SODIUM 40 MILLIGRAM(S): 100 INJECTION SUBCUTANEOUS at 11:20

## 2019-10-17 RX ADMIN — Medication 500 MILLIGRAM(S): at 21:36

## 2019-10-17 RX ADMIN — Medication 500 MILLIGRAM(S): at 14:10

## 2019-10-17 RX ADMIN — Medication 3 MILLIGRAM(S): at 14:10

## 2019-10-17 RX ADMIN — Medication 500 MILLIGRAM(S): at 06:14

## 2019-10-17 RX ADMIN — Medication 10 MILLIGRAM(S): at 06:14

## 2019-10-17 RX ADMIN — Medication 4: at 21:38

## 2019-10-17 RX ADMIN — Medication 650 MILLIGRAM(S): at 22:33

## 2019-10-17 RX ADMIN — Medication 4: at 08:55

## 2019-10-17 RX ADMIN — Medication 3 MILLIGRAM(S): at 06:14

## 2019-10-17 RX ADMIN — AMLODIPINE BESYLATE 10 MILLIGRAM(S): 2.5 TABLET ORAL at 06:14

## 2019-10-17 RX ADMIN — Medication 100 MILLIGRAM(S): at 14:10

## 2019-10-17 RX ADMIN — Medication 650 MILLIGRAM(S): at 21:44

## 2019-10-17 RX ADMIN — FAMOTIDINE 20 MILLIGRAM(S): 10 INJECTION INTRAVENOUS at 06:14

## 2019-10-17 RX ADMIN — FAMOTIDINE 20 MILLIGRAM(S): 10 INJECTION INTRAVENOUS at 17:16

## 2019-10-17 RX ADMIN — INSULIN GLARGINE 10 UNIT(S): 100 INJECTION, SOLUTION SUBCUTANEOUS at 21:37

## 2019-10-17 RX ADMIN — POLYETHYLENE GLYCOL 3350 17 GRAM(S): 17 POWDER, FOR SOLUTION ORAL at 11:20

## 2019-10-17 RX ADMIN — MONTELUKAST 10 MILLIGRAM(S): 4 TABLET, CHEWABLE ORAL at 11:20

## 2019-10-17 NOTE — PROGRESS NOTE ADULT - ASSESSMENT
59yoF h/o R MCA stroke (16yrs ago) found to have multiple extra-axial brain lesions and multiple pelvis masses, also LT calf DVT.  Patient is refusing surgical treatment.     PLAN:  NEURO:   -continue neuro checks q 4 hrs  -multiple brain lesions- refusing surgery  -continue VPA for seizure ppx  -analgesics prn  -continue decadron 3 q 8    PULM:   -room air  -incentive spirometry  -asthma: continue singulair and albuterol    CV:  -HTN: cont amlodipine and enalapril    ENDO:   -continue sliding scale    HEME/ONC:     - left calf DVT, serial dopplers on 10/17   - pt has IVCF-              DVT ppx: [X] SQL [] SQH [x] Venodynes bilaterally     RENAL:   - IVL    ID:   -afebrile    GI:   -CCD diet  -bowel regimen    DISCHARGE PLANNING: PT/OT: HEATHER      Assessment:  Please Check When Present   []  GCS  E   V  M     Heart Failure: []Acute, [] acute on chronic , []chronic  Heart Failure:  [] Diastolic (HFpEF), [] Systolic (HFrEF), []Combined (HFpEF and HFrEF), [] RHF, [] Pulm HTN, [] Other    [] GLENNA, [] ATN, [] AIN, [] other  [] CKD1, [] CKD2, [] CKD 3, [] CKD 4, [] CKD 5, []ESRD    Encephalopathy: [] Metabolic, [] Hepatic, [] toxic, [] Neurological, [] Other    Abnormal Nurtitional Status: [] malnurtition (see nutrition note), [ ]underweight: BMI < 19, [] morbid obesity: BMI >40, [] Cachexia    [] Sepsis  [] hypovolemic shock,[] cardiogenic shock, [] hemorrhagic shock, [] neuogenic shock  [] Acute Respiratory Failure  []Cerebral edema, [] Brain compression/ herniation,   [] Functional quadriplegia  [] Acute blood loss anemia    Will discuss plan with Dr. H[facility]ctor OrdazPiedmont [id number]

## 2019-10-17 NOTE — CHART NOTE - NSCHARTNOTEFT_GEN_A_CORE
Nutrition Follow Up Note    Patient seen for nutrition follow up. Per chart, pt is a 59 year old female found to have shift and vasogenic edema and meningioma. Pt is refusing surgical treatment. Noted with hyperglycemic in-house (POCT blood glucose noted below); decadron likely contributing factor.     Source: Comprehensive chart review, RN    Diet : Consistent Carbohydrate    Patient reports excellent appetite and intake. Endorses constant hunger which she contributes to smoking cessation; decadron likely also contributing factor. Reports PTA she manages DM with diet and does not take medications or check her finger sticks. Of note, per chart pt is prescribed Metformin and Tradjenta PTA, but pt reports she does not take these medications; ?accuracy. Recent HgbA1c of 5.6% suggests adequate glycemic control PTA.  Denies nausea/vomiting, diarrhea/constipation or other acute GI distress at this time. Last BM today.      PO intake : 100%     Source for PO intake: patient, RN     Enteral /Parenteral Nutrition: n/a    Daily weight: no new weight to address. Recommend obtain new weight to assess changes, if any.     Pertinent Medications: MEDICATIONS  (STANDING):  amLODIPine   Tablet 10 milliGRAM(s) Oral daily  dexamethasone     Tablet 3 milliGRAM(s) Oral three times a day  docusate sodium 100 milliGRAM(s) Oral three times a day  enalapril 10 milliGRAM(s) Oral daily  enoxaparin Injectable 40 milliGRAM(s) SubCutaneous daily  famotidine    Tablet 20 milliGRAM(s) Oral two times a day  insulin glargine Injectable (LANTUS) 10 Unit(s) SubCutaneous at bedtime  insulin lispro (HumaLOG) corrective regimen sliding scale   SubCutaneous at bedtime  insulin lispro (HumaLOG) corrective regimen sliding scale   SubCutaneous three times a day with meals  montelukast 10 milliGRAM(s) Oral daily  polyethylene glycol 3350 17 Gram(s) Oral daily  senna 2 Tablet(s) Oral at bedtime  simvastatin 40 milliGRAM(s) Oral at bedtime  valproic  acid Syrup 500 milliGRAM(s) Oral every 8 hours    MEDICATIONS  (PRN):  ALBUTerol    90 MICROgram(s) HFA Inhaler 2 Puff(s) Inhalation every 6 hours PRN Bronchospasm    Pertinent Labs: (10/16) Glucose 257 mg/dL, (10/11) HgbA1c 5.6%    Finger Sticks:  POCT Blood Glucose.: 260 mg/dL (10-17 @ 12:31)  POCT Blood Glucose.: 221 mg/dL (10-17 @ 08:45)  POCT Blood Glucose.: 213 mg/dL (10-16 @ 21:14)  POCT Blood Glucose.: 159 mg/dL (10-16 @ 17:32)      Skin per nursing documentation: intact  Edema per nursing documentation: none noted    Estimated Needs:   [ x] no change since previous assessment  [ ] recalculated:     Previous Nutrition Diagnosis: n/a    New Nutrition Diagnosis: n/a     Interventions: Discussed diabetes nutrition therapy and effect of steroids on blood sugar. Reviewed sources of carbohydrates and appropriate portion sizes, increasing intake of proteins or non-starchy vegetables in setting of hunger, and continuing to limit sugar-sweetened foods and vegetables. Also reviewed healthy snack options available for pt to order on menu. Pt receptive and very participative in discussion.     Recommend  1) Continue current diet order of Consistent Carbohydrate  2) Monitor blood glucose, electrolytes   3) Reinforce diet education PRN     Monitoring and Evaluation:     Continue to monitor Nutritional intake, Tolerance to diet prescription, weights, labs, skin integrity    RD remains available upon request and will follow up per protocol    Ximena Dang RD, CDN    Pager# [phone]

## 2019-10-17 NOTE — PROGRESS NOTE ADULT - SUBJECTIVE AND OBJECTIVE BOX
SUBJECTIVE: Patient seen and examined at bedside.  Denies any headaches, n/v.  Complains of feeling hungry all the time.      CHIEF COMPLAINT: headaches    OVERNIGHT EVENTS: none    Vital Signs Last 24 Hrs  T(C): 36.8 (17 Oct 2019 08:08), Max: 37 (17 Oct 2019 04:40)  T(F): 98.2 (17 Oct 2019 08:08), Max: 98.6 (17 Oct 2019 04:40)  HR: 60 (17 Oct 2019 08:08) (52 - 64)  BP: 119/77 (17 Oct 2019 08:08) (102/67 - 138/95)  BP(mean): --  RR: 17 (17 Oct 2019 08:08) (17 - 19)  SpO2: 98% (17 Oct 2019 08:08) (95% - 99%)    PHYSICAL EXAM:    General: No Acute Distress     Neurological: Awake, alert and oriented to self, place, month/date.  Speech clear.  Follows commands, TINSLEY.  Mild Lt facial and LT sensory drift.     Pulmonary: Clear to Auscultation, No Rales, No Rhonchi, No Wheezes     Cardiovascular: S1, S2, Regular Rate and Rhythm     Gastrointestinal: Soft, Nontender, Nondistended     LABS:                        17.0   8.67  )-----------( 196      ( 16 Oct 2019 09:59 )             50.5    10-16    137  |  99  |  20  ----------------------------<  257<H>  4.8   |  26  |  0.69    Ca    9.4      16 Oct 2019 06:54      Hemoglobin A1C, Whole Blood: 5.6 % (10-11 @ 05:14)      10-16 @ 07:01  -  10-17 @ 07:00  --------------------------------------------------------  IN: 880 mL / OUT: 0 mL / NET: 880 mL    10-17 @ 07:01  -  10-17 @ 11:24  --------------------------------------------------------  IN: 480 mL / OUT: 0 mL / NET: 480 mL      MEDICATIONS:  Antibiotics:    Neuro:  valproic  acid Syrup 500 milliGRAM(s) Oral every 8 hours    Cardiac:  amLODIPine   Tablet 10 milliGRAM(s) Oral daily  enalapril 10 milliGRAM(s) Oral daily    Pulm:  ALBUTerol    90 MICROgram(s) HFA Inhaler 2 Puff(s) Inhalation every 6 hours PRN Bronchospasm  montelukast 10 milliGRAM(s) Oral daily    GI/:  docusate sodium 100 milliGRAM(s) Oral three times a day  famotidine    Tablet 20 milliGRAM(s) Oral two times a day  polyethylene glycol 3350 17 Gram(s) Oral daily  senna 2 Tablet(s) Oral at bedtime    Other:   dexamethasone     Tablet 3 milliGRAM(s) Oral three times a day  enoxaparin Injectable 40 milliGRAM(s) SubCutaneous daily  insulin lispro (HumaLOG) corrective regimen sliding scale   SubCutaneous at bedtime  insulin lispro (HumaLOG) corrective regimen sliding scale   SubCutaneous three times a day with meals  simvastatin 40 milliGRAM(s) Oral at bedtime    DIET: [] Regular [X] CCD [] Renal [] Puree [] Dysphagia [] Tube Feeds:     IMAGING: < from: MR Head w/wo IV Cont (10.11.19 @ 17:46) >  Multiple avidly enhancing extra-axial masses in the supratentorial and   infratentorial regions as described, consistent with the presence of   multiple meningiomas. No vasogenic edema underlying these lesions.    Invasion of the left transverse sinus and superior sagittal sinus as   described.    Similar rightward midline shift of 1.2 cmon the basis of chronic right   MCA territory infarct with volume loss. No hydrocephalus or effacement of   basal cisterns.    No acute intracranial hemorrhage or acute territorial infarct.    < end of copied text >    < from: CT Chest w/ IV Cont (10.11.19 @ 10:37) >  Multiple pelvic masses, likely adnexal in origin. Apparent supracervical   hysterectomy. Consider further evaluation with pelvic ultrasound.  Appearance suggesting multiple spinal meningiomata. Consider spinal MRI   as clinically indicated    < end of copied text >    < from: VA Duplex Lower Ext Vein Scan, Bilat (10.10.19 @ 14:12) >  No evidence of deep venous thrombosis in the right lower extremity.    Positive deep venous thrombosis above the left knee within the left   popliteal vein.   Positive DVT is also noted within the left calf at the left gastrocnemius   vein.    < end of copied text >

## 2019-10-17 NOTE — CONSULT NOTE ADULT - SUBJECTIVE AND OBJECTIVE BOX
Patient is a 59y old  Female who presents with a chief complaint of brain mass (17 Oct 2019 11:23)    HPI: 59 Y F transferred from [place] for CT showing shift and vasogenic edema, and meningioma. Per tx paperwork pt is from NH has hx of CVA and HTN was sent in to OSH for AMS- CT head showed showed exta-axial masses, likely meningiomas, mass effect on the left ventricle with 1.3 cm left to right midline shift. She was given Keppra 100 mh, decadron 12mg, was agitated, got haldol 5mg and plan was to do MRI however tx here for neurosurgery.     Pt admitted to the ICU, MRI and CT done- report below. Found to have likely meningiomas and pelvic masses. Pt has refused any further intervention.    Reported to be confused in the ICU but currently awake, alert, oriented. Denies pain.      Past Medical, Past Surgical, and Family History:  PAST MEDICAL HISTORY:  COPD with asthma     DM (diabetes mellitus)     H/O ischemic right MCA stroke     HTN (hypertension)    IVC filter placement.    SH: Lives alone in a rent controlled apartment. Worried about future arrangement after discharge.    FH: No contributing FH in first degree relatives.     Allergies- None.     Vital Signs Last 24 Hrs  T(C): 36.8 (17 Oct 2019 12:21), Max: 37 (17 Oct 2019 04:40)  T(F): 98.2 (17 Oct 2019 12:21), Max: 98.6 (17 Oct 2019 04:40)  HR: 63 (17 Oct 2019 12:21) (52 - 64)  BP: 123/68 (17 Oct 2019 12:21) (119/77 - 138/95)  BP(mean): --  RR: 17 (17 Oct 2019 12:21) (17 - 18)  SpO2: 99% (17 Oct 2019 12:21) (95% - 99%)                          17.0   8.67  )-----------( 196      ( 16 Oct 2019 09:59 )             50.5     10-16    137  |  99  |  20  ----------------------------<  257<H>  4.8   |  26  |  0.69    Ca    9.4      16 Oct 2019 06:54      MRI- 10/11/19- Multiple avidly enhancing extra-axial masses in the supratentorial and infratentorial regions consistent with the presence of multiple meningiomas. No vasogenic edema underlying these lesions. Invasion of the left transverse sinus and superior sagittal sinus. Similar rightward midline shift of 1.2 cm on the basis of chronic right MCA territory infarct with volume loss. No hydrocephalus or effacement of basal cisterns.    CT c/a/p- 10/11- Multiple pelvic masses, likely adnexal in origin. Apparent supracervical hysterectomy. Appearance suggesting multiple spinal meningiomata.    Doppler u/s- 10/10/19- Positive deep venous thrombosis above the left knee within the left popliteal vein. Positive DVT is also noted within the left calf at the left gastrocnemius vein.      MEDICATIONS  (STANDING):  amLODIPine   Tablet 10 milliGRAM(s) Oral daily  dexamethasone     Tablet 3 milliGRAM(s) Oral three times a day  docusate sodium 100 milliGRAM(s) Oral three times a day  enalapril 10 milliGRAM(s) Oral daily  enoxaparin Injectable 40 milliGRAM(s) SubCutaneous daily  famotidine    Tablet 20 milliGRAM(s) Oral two times a day  insulin lispro (HumaLOG) corrective regimen sliding scale   SubCutaneous at bedtime  insulin lispro (HumaLOG) corrective regimen sliding scale   SubCutaneous three times a day with meals  montelukast 10 milliGRAM(s) Oral daily  polyethylene glycol 3350 17 Gram(s) Oral daily  senna 2 Tablet(s) Oral at bedtime  simvastatin 40 milliGRAM(s) Oral at bedtime  valproic  acid Syrup 500 milliGRAM(s) Oral every 8 hours    Home Medications:  albuterol 90 mcg/inh inhalation aerosol: 2 puff(s) inhaled 4 times a day (11 Oct 2019 19:20)  amLODIPine 10 mg oral tablet: 1 tab(s) orally once a day (11 Oct 2019 19:20)  enalapril 10 mg oral tablet: 1 tab(s) orally once a day (11 Oct 2019 19:20)  metFORMIN 500 mg oral tablet: 1 tab(s) orally 2 times a day (11 Oct 2019 19:20)  montelukast 10 mg oral tablet: 1 tab(s) orally once a day (at bedtime) (11 Oct 2019 19:20)  Tradjenta 5 mg oral tablet: 1 tab(s) orally once a day (11 Oct 2019 19:20)  Vascepa 1 g oral capsule: 2 cap(s) orally 2 times a day (11 Oct 2019 19:20)  Zocor 40 mg oral tablet: 1 tab(s) orally once a day (at bedtime) (11 Oct 2019 19:20)

## 2019-10-17 NOTE — CONSULT NOTE ADULT - ASSESSMENT
59 Y F transferred from [place] for CT showing shift and vasogenic edema, and meningioma. Per tx paperwork pt is from NH has hx of CVA and HTN was sent in to OSH for AMS- CT head showed showed exta-axial masses, likely meningiomas, mass effect on the left ventricle with 1.3 cm left to right midline shift. She was given Keppra 100 mh, decadron 12mg, was agitated, got haldol 5mg and plan was to do MRI however tx here for neurosurgery.     Pt admitted to the ICU.    MRI- 10/11/19- Multiple avidly enhancing extra-axial masses in the supratentorial and infratentorial regions consistent with the presence of multiple meningiomas. No vasogenic edema underlying these lesions. Invasion of the left transverse sinus and superior sagittal sinus. Similar rightward midline shift of 1.2 cm on the basis of chronic right MCA territory infarct with volume loss. No hydrocephalus or effacement of basal cisterns.    CT c/a/p- 10/11- Multiple pelvic masses, likely adnexal in origin. Apparent supracervical hysterectomy. Appearance suggesting multiple spinal meningiomata.    Pt has refused any further intervention.    Reported to be confused in the ICU but currently awake, alert, oriented. Denies pain. 59 Y F transferred from [place] for CT showing shift and vasogenic edema, and meningioma. Pt admitted to the ICU.    MRI- 10/11/19- Multiple avidly enhancing extra-axial masses in the supratentorial and infratentorial regions consistent with the presence of multiple meningiomas. No vasogenic edema underlying these lesions. Invasion of the left transverse sinus and superior sagittal sinus. Similar rightward midline shift of 1.2 cm on the basis of chronic right MCA territory infarct with volume loss. No hydrocephalus or effacement of basal cisterns.    CT c/a/p- 10/11- Multiple pelvic masses, likely adnexal in origin. Apparent supracervical hysterectomy. Appearance suggesting multiple spinal meningiomata.    Pt has refused any further intervention.    Reported to be confused in the ICU but currently awake, alert, oriented. Denies pain.

## 2019-10-18 DIAGNOSIS — R73.9 HYPERGLYCEMIA, UNSPECIFIED: ICD-10-CM

## 2019-10-18 DIAGNOSIS — E11.65 TYPE 2 DIABETES MELLITUS WITH HYPERGLYCEMIA: ICD-10-CM

## 2019-10-18 DIAGNOSIS — E78.5 HYPERLIPIDEMIA, UNSPECIFIED: ICD-10-CM

## 2019-10-18 DIAGNOSIS — I10 ESSENTIAL (PRIMARY) HYPERTENSION: ICD-10-CM

## 2019-10-18 LAB
GLUCOSE BLDC GLUCOMTR-MCNC: 224 MG/DL — HIGH (ref 70–99)
GLUCOSE BLDC GLUCOMTR-MCNC: 262 MG/DL — HIGH (ref 70–99)
GLUCOSE BLDC GLUCOMTR-MCNC: 328 MG/DL — HIGH (ref 70–99)
GLUCOSE BLDC GLUCOMTR-MCNC: 349 MG/DL — HIGH (ref 70–99)

## 2019-10-18 PROCEDURE — 99223 1ST HOSP IP/OBS HIGH 75: CPT

## 2019-10-18 PROCEDURE — 99232 SBSQ HOSP IP/OBS MODERATE 35: CPT

## 2019-10-18 PROCEDURE — 93970 EXTREMITY STUDY: CPT | Mod: 26

## 2019-10-18 RX ORDER — ENOXAPARIN SODIUM 100 MG/ML
80 INJECTION SUBCUTANEOUS EVERY 12 HOURS
Refills: 0 | Status: DISCONTINUED | OUTPATIENT
Start: 2019-10-18 | End: 2019-10-18

## 2019-10-18 RX ORDER — INSULIN GLARGINE 100 [IU]/ML
16 INJECTION, SOLUTION SUBCUTANEOUS AT BEDTIME
Refills: 0 | Status: DISCONTINUED | OUTPATIENT
Start: 2019-10-18 | End: 2019-10-19

## 2019-10-18 RX ORDER — INSULIN LISPRO 100/ML
8 VIAL (ML) SUBCUTANEOUS
Refills: 0 | Status: DISCONTINUED | OUTPATIENT
Start: 2019-10-18 | End: 2019-10-19

## 2019-10-18 RX ORDER — ENOXAPARIN SODIUM 100 MG/ML
40 INJECTION SUBCUTANEOUS DAILY
Refills: 0 | Status: DISCONTINUED | OUTPATIENT
Start: 2019-10-18 | End: 2019-10-21

## 2019-10-18 RX ADMIN — Medication 8: at 13:34

## 2019-10-18 RX ADMIN — Medication 8 UNIT(S): at 18:03

## 2019-10-18 RX ADMIN — INSULIN GLARGINE 16 UNIT(S): 100 INJECTION, SOLUTION SUBCUTANEOUS at 21:43

## 2019-10-18 RX ADMIN — Medication 500 MILLIGRAM(S): at 12:44

## 2019-10-18 RX ADMIN — SIMVASTATIN 40 MILLIGRAM(S): 20 TABLET, FILM COATED ORAL at 21:44

## 2019-10-18 RX ADMIN — Medication 8: at 18:04

## 2019-10-18 RX ADMIN — Medication 10 MILLIGRAM(S): at 05:36

## 2019-10-18 RX ADMIN — Medication 3 MILLIGRAM(S): at 05:36

## 2019-10-18 RX ADMIN — FAMOTIDINE 20 MILLIGRAM(S): 10 INJECTION INTRAVENOUS at 05:36

## 2019-10-18 RX ADMIN — Medication 3 MILLIGRAM(S): at 21:44

## 2019-10-18 RX ADMIN — Medication 4: at 09:00

## 2019-10-18 RX ADMIN — MONTELUKAST 10 MILLIGRAM(S): 4 TABLET, CHEWABLE ORAL at 12:44

## 2019-10-18 RX ADMIN — Medication 2: at 21:42

## 2019-10-18 RX ADMIN — FAMOTIDINE 20 MILLIGRAM(S): 10 INJECTION INTRAVENOUS at 18:05

## 2019-10-18 RX ADMIN — Medication 500 MILLIGRAM(S): at 21:43

## 2019-10-18 RX ADMIN — Medication 3 MILLIGRAM(S): at 12:43

## 2019-10-18 RX ADMIN — Medication 500 MILLIGRAM(S): at 05:36

## 2019-10-18 RX ADMIN — ENOXAPARIN SODIUM 40 MILLIGRAM(S): 100 INJECTION SUBCUTANEOUS at 12:43

## 2019-10-18 RX ADMIN — AMLODIPINE BESYLATE 10 MILLIGRAM(S): 2.5 TABLET ORAL at 05:36

## 2019-10-18 NOTE — CONSULT NOTE ADULT - PROBLEM SELECTOR RECOMMENDATION 2
- HbA1c 5.6%, at goal on metformin therapy  - glycemic control currently exacerbated by steroids  - adjust basal bolus insulin as described above  - will follow  - discharge plan to be determined by steroid plan
Doppler u/s- 10/10/19- Positive deep venous thrombosis above the left knee within the left popliteal vein. Positive DVT is also noted within the left calf at the left gastrocnemius vein.     Repeat doppler today to assess for propagation.     Would recommend full dose anticoagulation if bleeding risk low.

## 2019-10-18 NOTE — CONSULT NOTE ADULT - PROBLEM SELECTOR RECOMMENDATION 9
- recommend start Humalog 8 units before meals and increase Lantus to 16 units qhs  - c/w moderate correction scale qac and qhs  - consistent carb diet  - check BG qac and qhs  - will follow  - please notify endocrine team of any changes to steroid plan or po status
MRI as above. Extra-axial masses, likely meningiomas. Will discuss bleeding risk w neurosurgery.

## 2019-10-18 NOTE — CONSULT NOTE ADULT - ATTENDING COMMENTS
Donna Aguilera MD   Pager # [phone]  On evenings and weekends, please call the office at [phone] or page endocrine fellow on call. Please note that this patient may be followed by different provider tomorrow. If no answer, contact the office.
Plan d/w COMPA [facility].

## 2019-10-18 NOTE — CONSULT NOTE ADULT - SUBJECTIVE AND OBJECTIVE BOX
HPI:  59 year old woman with PMH HTN, HLD, CVA, DM2, transferred from [place] for CT showing shift and vasogenic edema, and meningioma, now here for management of meningiomas. Consult called for management of steroid induced hyperglycemia.     PAST MEDICAL & SURGICAL HISTORY:  DM (diabetes mellitus)  HTN (hypertension)  H/O ischemic right MCA stroke  COPD with asthma      FAMILY HISTORY:      Social History:    Outpatient Medications:    MEDICATIONS  (STANDING):  amLODIPine   Tablet 10 milliGRAM(s) Oral daily  dexamethasone     Tablet 3 milliGRAM(s) Oral three times a day  docusate sodium 100 milliGRAM(s) Oral three times a day  enalapril 10 milliGRAM(s) Oral daily  enoxaparin Injectable 40 milliGRAM(s) SubCutaneous daily  famotidine    Tablet 20 milliGRAM(s) Oral two times a day  insulin glargine Injectable (LANTUS) 10 Unit(s) SubCutaneous at bedtime  insulin lispro (HumaLOG) corrective regimen sliding scale   SubCutaneous at bedtime  insulin lispro (HumaLOG) corrective regimen sliding scale   SubCutaneous three times a day with meals  montelukast 10 milliGRAM(s) Oral daily  polyethylene glycol 3350 17 Gram(s) Oral daily  senna 2 Tablet(s) Oral at bedtime  simvastatin 40 milliGRAM(s) Oral at bedtime  valproic  acid Syrup 500 milliGRAM(s) Oral every 8 hours    MEDICATIONS  (PRN):  acetaminophen   Tablet .. 650 milliGRAM(s) Oral every 6 hours PRN Temp greater or equal to 38C (100.4F), Mild Pain (1 - 3)  ALBUTerol    90 MICROgram(s) HFA Inhaler 2 Puff(s) Inhalation every 6 hours PRN Bronchospasm      Allergies  phenobarbital (Unknown)    Review of Systems:  Constitutional: No fever  Eyes: No blurry vision  Neuro: No tremors  HEENT: No pain  Cardiovascular: No chest pain, palpitations  Respiratory: No SOB, no cough  GI: No nausea, vomiting, abdominal pain  : No dysuria  Skin: no rash  Psych: no depression  Endocrine: no polyuria, polydipsia  Hem/lymph: no swelling  Osteoporosis: no fractures    ALL OTHER SYSTEMS REVIEWED AND NEGATIVE    PHYSICAL EXAM:  VITALS: T(C): 36.8 (10-18-19 @ 12:35)  T(F): 98.3 (10-18-19 @ 12:35), Max: 98.5 (10-17-19 @ 16:00)  HR: 79 (10-18-19 @ 12:35) (55 - 79)  BP: 134/82 (10-18-19 @ 12:35) (103/64 - 134/82)  RR:  (17 - 18)  SpO2:  (95% - 98%)  Wt(kg): --  GENERAL: NAD, well-groomed, well-developed  EYES: No proptosis, no lid lag, anicteric  HEENT:  Atraumatic, Normocephalic, moist mucous membranes  THYROID: Normal size, no palpable nodules  RESPIRATORY: Clear to auscultation bilaterally; No rales, rhonchi, wheezing  CARDIOVASCULAR: Regular rate and rhythm; No murmurs; no peripheral edema  GI: Soft, nontender, non distended, normal bowel sounds  SKIN: Dry, intact, No rashes or lesions  MUSCULOSKELETAL: Full range of motion, normal strength  NEURO: sensation intact, extraocular movements intact, no tremor  PSYCH: Alert and oriented x 3, normal affect, normal mood  CUSHING'S SIGNS: no striae      POCT Blood Glucose.: 349 mg/dL (10-18-19 @ 12:49)  POCT Blood Glucose.: 224 mg/dL (10-18-19 @ 08:19)  POCT Blood Glucose.: 313 mg/dL (10-17-19 @ 21:30)  POCT Blood Glucose.: 134 mg/dL (10-17-19 @ 17:30)  POCT Blood Glucose.: 260 mg/dL (10-17-19 @ 12:31)  POCT Blood Glucose.: 221 mg/dL (10-17-19 @ 08:45)  POCT Blood Glucose.: 213 mg/dL (10-16-19 @ 21:14)  POCT Blood Glucose.: 159 mg/dL (10-16-19 @ 17:32)  POCT Blood Glucose.: 215 mg/dL (10-16-19 @ 12:56)  POCT Blood Glucose.: 199 mg/dL (10-16-19 @ 08:24)  POCT Blood Glucose.: 176 mg/dL (10-15-19 @ 22:02)  POCT Blood Glucose.: 233 mg/dL (10-15-19 @ 17:22)                          17.0   8.67  )-----------( 196      ( 16 Oct 2019 09:59 )             50.5       10-16    137  |  99  |  20  ----------------------------<  257<H>  4.8   |  26  |  0.69    EGFR if : 112  EGFR if non : 97    Ca    9.4      10-16      Hemoglobin A1C, Whole Blood: 5.6 % [4.0 - 5.6] (10-11-19 @ 05:14) HPI:  59 year old woman with PMH HTN, HLD, CVA, DM2, transferred from [place] for CT showing shift and vasogenic edema, and meningioma, now here for management of meningiomas. Consult called for management of steroid induced hyperglycemia.     HbA1c here 5.6%. No recent blood transfusions. Patient states that she was diagnosed with DM2 around the age of 52. Believes that she takes Metformin 500 mg BID, but does not always take it. Has a glucometer but doesn't check BG. She occasionally has symptoms of neuropathy in the hands. No symptoms of neuropathy in the feet. No history of nephropathy. Last optho visit was more than a year ago and patient is unsure of whether she has retinopathy. She does not have blurry vision. Has polyuria and polydipsia. Here, she is eating well but is snacking in between meals. Does not have nausea, vomiting, abdominal pain. Also has a history of CVA in her 30's.    She also takes medication for HTN and HLD, but does not know the names.    Here she is being treated with decadron for meningiomas. She is refusing resection.     PAST MEDICAL & SURGICAL HISTORY:  DM (diabetes mellitus)  HTN (hypertension)  H/O ischemic right MCA stroke  COPD with asthma    FAMILY HISTORY:  DM2 in mother    Social History:  Current smoker  No alcohol use  Lives in assisted living facility     Outpatient Medications:  Metformin 500 mg BID  Does not know names of medications for HTN and HLD    MEDICATIONS  (STANDING):  amLODIPine   Tablet 10 milliGRAM(s) Oral daily  dexamethasone     Tablet 3 milliGRAM(s) Oral three times a day  docusate sodium 100 milliGRAM(s) Oral three times a day  enalapril 10 milliGRAM(s) Oral daily  enoxaparin Injectable 40 milliGRAM(s) SubCutaneous daily  famotidine    Tablet 20 milliGRAM(s) Oral two times a day  insulin glargine Injectable (LANTUS) 10 Unit(s) SubCutaneous at bedtime  insulin lispro (HumaLOG) corrective regimen sliding scale   SubCutaneous at bedtime  insulin lispro (HumaLOG) corrective regimen sliding scale   SubCutaneous three times a day with meals  montelukast 10 milliGRAM(s) Oral daily  polyethylene glycol 3350 17 Gram(s) Oral daily  senna 2 Tablet(s) Oral at bedtime  simvastatin 40 milliGRAM(s) Oral at bedtime  valproic  acid Syrup 500 milliGRAM(s) Oral every 8 hours    MEDICATIONS  (PRN):  acetaminophen   Tablet .. 650 milliGRAM(s) Oral every 6 hours PRN Temp greater or equal to 38C (100.4F), Mild Pain (1 - 3)  ALBUTerol    90 MICROgram(s) HFA Inhaler 2 Puff(s) Inhalation every 6 hours PRN Bronchospasm      Allergies  phenobarbital (Unknown)    Review of Systems:  Constitutional: No fever  Eyes: No blurry vision  Neuro: No tremors  HEENT: No pain  Cardiovascular: No chest pain, palpitations  Respiratory: No SOB, no cough  GI: No nausea, vomiting, abdominal pain  : No dysuria  Skin: no rash  Endocrine: + polyuria, polydipsia    ALL OTHER SYSTEMS REVIEWED AND NEGATIVE    PHYSICAL EXAM:  VITALS: T(C): 36.8 (10-18-19 @ 12:35)  T(F): 98.3 (10-18-19 @ 12:35), Max: 98.5 (10-17-19 @ 16:00)  HR: 79 (10-18-19 @ 12:35) (55 - 79)  BP: 134/82 (10-18-19 @ 12:35) (103/64 - 134/82)  RR:  (17 - 18)  SpO2:  (95% - 98%)  Wt(kg): --  GENERAL: NAD, well-developed  EYES: No proptosis, anicteric  HEENT:  Atraumatic, Normocephalic  THYROID: Normal size, no palpable nodules  RESPIRATORY: Clear to auscultation bilaterally; No rales, rhonchi, wheezing  CARDIOVASCULAR: Regular rate and rhythm; No murmurs; no peripheral edema  GI: Soft, nontender, non distended, normal bowel sounds  SKIN: Dry, intact, No ulcers or wounds on feet  PSYCH: Alert and oriented x 3, reactive affect    POCT Blood Glucose.: 349 mg/dL (10-18-19 @ 12:49) H 8  POCT Blood Glucose.: 224 mg/dL (10-18-19 @ 08:19) H 4  POCT Blood Glucose.: 313 mg/dL (10-17-19 @ 21:30) L 10, H 4  POCT Blood Glucose.: 134 mg/dL (10-17-19 @ 17:30)  POCT Blood Glucose.: 260 mg/dL (10-17-19 @ 12:31) H 6  POCT Blood Glucose.: 221 mg/dL (10-17-19 @ 08:45) H 4  POCT Blood Glucose.: 213 mg/dL (10-16-19 @ 21:14)  POCT Blood Glucose.: 159 mg/dL (10-16-19 @ 17:32)  POCT Blood Glucose.: 215 mg/dL (10-16-19 @ 12:56)  POCT Blood Glucose.: 199 mg/dL (10-16-19 @ 08:24)  POCT Blood Glucose.: 176 mg/dL (10-15-19 @ 22:02)  POCT Blood Glucose.: 233 mg/dL (10-15-19 @ 17:22)                          17.0   8.67  )-----------( 196      ( 16 Oct 2019 09:59 )             50.5       10-16    137  |  99  |  20  ----------------------------<  257<H>  4.8   |  26  |  0.69    EGFR if : 112  EGFR if non : 97    Ca    9.4      10-16      Hemoglobin A1C, Whole Blood: 5.6 % [4.0 - 5.6] (10-11-19 @ 05:14)

## 2019-10-18 NOTE — PROGRESS NOTE ADULT - SUBJECTIVE AND OBJECTIVE BOX
SUBJECTIVE: Patient seen and examined at bedside.  Denies any headaches, n/v.  Still adamantly refusing surgery.     CHIEF COMPLAINT: headaches    OVERNIGHT EVENTS: none    ICU Vital Signs Last 24 Hrs  T(C): 36.8 (18 Oct 2019 07:49), Max: 36.9 (17 Oct 2019 16:00)  T(F): 98.2 (18 Oct 2019 07:49), Max: 98.5 (17 Oct 2019 16:00)  HR: 55 (18 Oct 2019 07:49) (55 - 64)  BP: 114/66 (18 Oct 2019 07:49) (103/64 - 123/68)  BP(mean): --  ABP: --  ABP(mean): --  RR: 17 (18 Oct 2019 07:49) (17 - 18)  SpO2: 98% (18 Oct 2019 07:49) (95% - 99%)      PHYSICAL EXAM:    General: No Acute Distress     Neurological: Awake, alert and oriented to self, place, month/date.  Speech clear.  Follows commands, T[place].  Mild Lt facial and LT sensory drift.     Pulmonary: Clear to Auscultation, No Rales, No Rhonchi, No Wheezes     Cardiovascular: S1, S2, Regular Rate and Rhythm     Gastrointestinal: Soft, Nontender, Nondistended     LABS:                        17.0   8.67  )-----------( 196      ( 16 Oct 2019 09:59 )             50.5    10-16    137  |  99  |  20  ----------------------------<  257<H>  4.8   |  26  |  0.69    Ca    9.4      16 Oct 2019 06:54      Hemoglobin A1C, Whole Blood: 5.6 % (10-11 @ 05:14)      10-16 @ 07:01  -  10-17 @ 07:00  --------------------------------------------------------  IN: 880 mL / OUT: 0 mL / NET: 880 mL    10-17 @ 07:01  -  10-17 @ 11:24  --------------------------------------------------------  IN: 480 mL / OUT: 0 mL / NET: 480 mL      MEDICATIONS  (STANDING):  amLODIPine   Tablet 10 milliGRAM(s) Oral daily  dexamethasone     Tablet 3 milliGRAM(s) Oral three times a day  docusate sodium 100 milliGRAM(s) Oral three times a day  enalapril 10 milliGRAM(s) Oral daily  enoxaparin Injectable 40 milliGRAM(s) SubCutaneous daily  famotidine    Tablet 20 milliGRAM(s) Oral two times a day  insulin glargine Injectable (LANTUS) 10 Unit(s) SubCutaneous at bedtime  insulin lispro (HumaLOG) corrective regimen sliding scale   SubCutaneous at bedtime  insulin lispro (HumaLOG) corrective regimen sliding scale   SubCutaneous three times a day with meals  montelukast 10 milliGRAM(s) Oral daily  polyethylene glycol 3350 17 Gram(s) Oral daily  senna 2 Tablet(s) Oral at bedtime  simvastatin 40 milliGRAM(s) Oral at bedtime  valproic  acid Syrup 500 milliGRAM(s) Oral every 8 hours    MEDICATIONS  (PRN):  acetaminophen   Tablet .. 650 milliGRAM(s) Oral every 6 hours PRN Temp greater or equal to 38C (100.4F), Mild Pain (1 - 3)  ALBUTerol    90 MICROgram(s) HFA Inhaler 2 Puff(s) Inhalation every 6 hours PRN Bronchospasm      DIET: [] Regular [X] CCD [] Renal [] Puree [] Dysphagia [] Tube Feeds:     IMAGING: < from: MR Head w/wo IV Cont (10.11.19 @ 17:46) >  Multiple avidly enhancing extra-axial masses in the supratentorial and   infratentorial regions as described, consistent with the presence of   multiple meningiomas. No vasogenic edema underlying these lesions.    Invasion of the left transverse sinus and superior sagittal sinus as   described.    Similar rightward midline shift of 1.2 cmon the basis of chronic right   MCA territory infarct with volume loss. No hydrocephalus or effacement of   basal cisterns.    No acute intracranial hemorrhage or acute territorial infarct.    < end of copied text >    < from: CT Chest w/ IV Cont (10.11.19 @ 10:37) >  Multiple pelvic masses, likely adnexal in origin. Apparent supracervical   hysterectomy. Consider further evaluation with pelvic ultrasound.  Appearance suggesting multiple spinal meningiomata. Consider spinal MRI   as clinically indicated    < end of copied text >    < from: VA Duplex Lower Ext Vein Scan, Bilat (10.10.19 @ 14:12) >  No evidence of deep venous thrombosis in the right lower extremity.    Positive deep venous thrombosis above the left knee within the left   popliteal vein.   Positive DVT is also noted within the left calf at the left gastrocnemius   vein.    < end of copied text >

## 2019-10-18 NOTE — CONSULT NOTE ADULT - PROBLEM SELECTOR RECOMMENDATION 3
Prescriptions refilled.  Due for follow up with me in November.   Please assist with scheduling.  Make aware prescriptions at  and ready for pickup   
- BP at goal  - c/w Amlodipine and Enalapril
Steroid induced. A1C 5.6.    Low dose PM Lantus added. Continue SSI.

## 2019-10-18 NOTE — PROGRESS NOTE ADULT - SUBJECTIVE AND OBJECTIVE BOX
Patient is a 59y old  Female who presents with a chief complaint of brain mass (18 Oct 2019 13:33)    HPI: Pt walking around in the room. Feels well.     Vital Signs Last 24 Hrs  T(C): 36.8 (18 Oct 2019 12:35), Max: 36.9 (17 Oct 2019 16:00)  T(F): 98.3 (18 Oct 2019 12:35), Max: 98.5 (17 Oct 2019 16:00)  HR: 79 (18 Oct 2019 12:35) (55 - 79)  BP: 134/82 (18 Oct 2019 12:35) (103/64 - 134/82)  BP(mean): --  RR: 17 (18 Oct 2019 12:35) (17 - 18)  SpO2: 98% (18 Oct 2019 12:35) (95% - 98%)        MEDICATIONS  (STANDING):  amLODIPine   Tablet 10 milliGRAM(s) Oral daily  dexamethasone     Tablet 3 milliGRAM(s) Oral three times a day  docusate sodium 100 milliGRAM(s) Oral three times a day  enalapril 10 milliGRAM(s) Oral daily  enoxaparin Injectable 80 milliGRAM(s) SubCutaneous every 12 hours  famotidine    Tablet 20 milliGRAM(s) Oral two times a day  insulin glargine Injectable (LANTUS) 16 Unit(s) SubCutaneous at bedtime  insulin lispro (HumaLOG) corrective regimen sliding scale   SubCutaneous at bedtime  insulin lispro (HumaLOG) corrective regimen sliding scale   SubCutaneous three times a day with meals  insulin lispro Injectable (HumaLOG) 8 Unit(s) SubCutaneous three times a day before meals  montelukast 10 milliGRAM(s) Oral daily  polyethylene glycol 3350 17 Gram(s) Oral daily  senna 2 Tablet(s) Oral at bedtime  simvastatin 40 milliGRAM(s) Oral at bedtime  valproic  acid Syrup 500 milliGRAM(s) Oral every 8 hours    MEDICATIONS  (PRN):  acetaminophen   Tablet .. 650 milliGRAM(s) Oral every 6 hours PRN Temp greater or equal to 38C (100.4F), Mild Pain (1 - 3)  ALBUTerol    90 MICROgram(s) HFA Inhaler 2 Puff(s) Inhalation every 6 hours PRN Bronchospasm

## 2019-10-18 NOTE — PROGRESS NOTE ADULT - ASSESSMENT
59 Y F transferred from [place] for CT showing shift and vasogenic edema, and meningioma. Pt admitted to the ICU.    MRI- 10/11/19- Multiple avidly enhancing extra-axial masses in the supratentorial and infratentorial regions consistent with the presence of multiple meningiomas. No vasogenic edema underlying these lesions. Invasion of the left transverse sinus and superior sagittal sinus. Similar rightward midline shift of 1.2 cm on the basis of chronic right MCA territory infarct with volume loss. No hydrocephalus or effacement of basal cisterns.    CT c/a/p- 10/11- Multiple pelvic masses, likely adnexal in origin. Apparent supracervical hysterectomy. Appearance suggesting multiple spinal meningiomata.    Pt has refused any further intervention.    Reported to be confused in the ICU but currently awake, alert, oriented. Denies pain.

## 2019-10-18 NOTE — CONSULT NOTE ADULT - PROBLEM SELECTOR PROBLEM 2
Type 2 diabetes mellitus with hyperglycemia, without long-term current use of insulin
DVT (deep venous thrombosis)

## 2019-10-18 NOTE — CONSULT NOTE ADULT - ASSESSMENT
59 year old woman with PMH HTN, HLD, CVA, DM2, here for management of meningiomas, also with steroid induced hyperglycemia. BG goal 100-180 mg/dL.

## 2019-10-18 NOTE — PROGRESS NOTE ADULT - ASSESSMENT
59yoF h/o R MCA stroke (16yrs ago) found to have multiple extra-axial brain lesions and multiple pelvis masses, also LT calf DVT.  Patient is refusing surgical treatment.     PLAN:  NEURO:   -continue neuro checks q 4 hrs  -multiple brain lesions- refusing surgery  -continue VPA for seizure ppx  -analgesics prn  -continue decadron 3 q 8    PULM:   -room air  -incentive spirometry  -asthma: continue singulair and albuterol    CV:  -HTN: cont amlodipine and enalapril    ENDO:   -continue sliding scale  -elevated finger sticks, lantus started yesterday    HEME/ONC:     - left calf DVT, rpt serial dopplers -p; if propagation of DVT, will start AC  - pt has IVCF-              DVT ppx: [X] SQL [] SQH [x] Venodynes bilaterally     RENAL:   - IVL    ID:   -afebrile    GI:   -CCD diet  -bowel regimen    DISCHARGE PLANNING: PT/OT: HEATHER      Assessment:  Please Check When Present   []  GCS  E   V  M     Heart Failure: []Acute, [] acute on chronic , []chronic  Heart Failure:  [] Diastolic (HFpEF), [] Systolic (HFrEF), []Combined (HFpEF and HFrEF), [] RHF, [] Pulm HTN, [] Other    [] GLENNA, [] ATN, [] AIN, [] other  [] CKD1, [] CKD2, [] CKD 3, [] CKD 4, [] CKD 5, []ESRD    Encephalopathy: [] Metabolic, [] Hepatic, [] toxic, [] Neurological, [] Other    Abnormal Nurtitional Status: [] malnurtition (see nutrition note), [ ]underweight: BMI < 19, [] morbid obesity: BMI >40, [] Cachexia    [] Sepsis  [] hypovolemic shock,[] cardiogenic shock, [] hemorrhagic shock, [] neuogenic shock  [] Acute Respiratory Failure  []Cerebral edema, [] Brain compression/ herniation,   [] Functional quadriplegia  [] Acute blood loss anemia    Will discuss plan with [id number]

## 2019-10-19 LAB
ANION GAP SERPL CALC-SCNC: 10 MMOL/L — SIGNIFICANT CHANGE UP (ref 5–17)
BUN SERPL-MCNC: 18 MG/DL — SIGNIFICANT CHANGE UP (ref 7–23)
CALCIUM SERPL-MCNC: 9.1 MG/DL — SIGNIFICANT CHANGE UP (ref 8.4–10.5)
CHLORIDE SERPL-SCNC: 95 MMOL/L — LOW (ref 96–108)
CO2 SERPL-SCNC: 26 MMOL/L — SIGNIFICANT CHANGE UP (ref 22–31)
CREAT SERPL-MCNC: 0.66 MG/DL — SIGNIFICANT CHANGE UP (ref 0.5–1.3)
GLUCOSE BLDC GLUCOMTR-MCNC: 175 MG/DL — HIGH (ref 70–99)
GLUCOSE BLDC GLUCOMTR-MCNC: 188 MG/DL — HIGH (ref 70–99)
GLUCOSE BLDC GLUCOMTR-MCNC: 202 MG/DL — HIGH (ref 70–99)
GLUCOSE BLDC GLUCOMTR-MCNC: 224 MG/DL — HIGH (ref 70–99)
GLUCOSE SERPL-MCNC: 321 MG/DL — HIGH (ref 70–99)
HCT VFR BLD CALC: 49.4 % — HIGH (ref 34.5–45)
HGB BLD-MCNC: 16.8 G/DL — HIGH (ref 11.5–15.5)
MCHC RBC-ENTMCNC: 30.2 PG — SIGNIFICANT CHANGE UP (ref 27–34)
MCHC RBC-ENTMCNC: 34 GM/DL — SIGNIFICANT CHANGE UP (ref 32–36)
MCV RBC AUTO: 88.8 FL — SIGNIFICANT CHANGE UP (ref 80–100)
PLATELET # BLD AUTO: 177 K/UL — SIGNIFICANT CHANGE UP (ref 150–400)
POTASSIUM SERPL-MCNC: 4.6 MMOL/L — SIGNIFICANT CHANGE UP (ref 3.5–5.3)
POTASSIUM SERPL-SCNC: 4.6 MMOL/L — SIGNIFICANT CHANGE UP (ref 3.5–5.3)
RBC # BLD: 5.56 M/UL — HIGH (ref 3.8–5.2)
RBC # FLD: 12 % — SIGNIFICANT CHANGE UP (ref 10.3–14.5)
SODIUM SERPL-SCNC: 131 MMOL/L — LOW (ref 135–145)
WBC # BLD: 7.39 K/UL — SIGNIFICANT CHANGE UP (ref 3.8–10.5)
WBC # FLD AUTO: 7.39 K/UL — SIGNIFICANT CHANGE UP (ref 3.8–10.5)

## 2019-10-19 PROCEDURE — 99232 SBSQ HOSP IP/OBS MODERATE 35: CPT

## 2019-10-19 PROCEDURE — 99231 SBSQ HOSP IP/OBS SF/LOW 25: CPT

## 2019-10-19 RX ORDER — INSULIN GLARGINE 100 [IU]/ML
20 INJECTION, SOLUTION SUBCUTANEOUS AT BEDTIME
Refills: 0 | Status: DISCONTINUED | OUTPATIENT
Start: 2019-10-19 | End: 2019-10-20

## 2019-10-19 RX ORDER — INSULIN LISPRO 100/ML
10 VIAL (ML) SUBCUTANEOUS
Refills: 0 | Status: DISCONTINUED | OUTPATIENT
Start: 2019-10-19 | End: 2019-10-20

## 2019-10-19 RX ADMIN — Medication 10 UNIT(S): at 12:44

## 2019-10-19 RX ADMIN — Medication 3 MILLIGRAM(S): at 21:44

## 2019-10-19 RX ADMIN — Medication 3 MILLIGRAM(S): at 14:44

## 2019-10-19 RX ADMIN — Medication 500 MILLIGRAM(S): at 05:50

## 2019-10-19 RX ADMIN — Medication 4: at 12:44

## 2019-10-19 RX ADMIN — FAMOTIDINE 20 MILLIGRAM(S): 10 INJECTION INTRAVENOUS at 05:50

## 2019-10-19 RX ADMIN — Medication 2: at 18:06

## 2019-10-19 RX ADMIN — ENOXAPARIN SODIUM 40 MILLIGRAM(S): 100 INJECTION SUBCUTANEOUS at 12:44

## 2019-10-19 RX ADMIN — Medication 4: at 09:14

## 2019-10-19 RX ADMIN — SIMVASTATIN 40 MILLIGRAM(S): 20 TABLET, FILM COATED ORAL at 21:44

## 2019-10-19 RX ADMIN — Medication 10 UNIT(S): at 18:07

## 2019-10-19 RX ADMIN — Medication 3 MILLIGRAM(S): at 05:49

## 2019-10-19 RX ADMIN — MONTELUKAST 10 MILLIGRAM(S): 4 TABLET, CHEWABLE ORAL at 12:44

## 2019-10-19 RX ADMIN — Medication 500 MILLIGRAM(S): at 21:44

## 2019-10-19 RX ADMIN — Medication 8 UNIT(S): at 09:15

## 2019-10-19 RX ADMIN — INSULIN GLARGINE 20 UNIT(S): 100 INJECTION, SOLUTION SUBCUTANEOUS at 21:44

## 2019-10-19 RX ADMIN — AMLODIPINE BESYLATE 10 MILLIGRAM(S): 2.5 TABLET ORAL at 05:50

## 2019-10-19 RX ADMIN — Medication 10 MILLIGRAM(S): at 05:50

## 2019-10-19 RX ADMIN — Medication 500 MILLIGRAM(S): at 14:43

## 2019-10-19 RX ADMIN — FAMOTIDINE 20 MILLIGRAM(S): 10 INJECTION INTRAVENOUS at 18:00

## 2019-10-19 NOTE — PROGRESS NOTE ADULT - SUBJECTIVE AND OBJECTIVE BOX
Diabetes Follow up note:  Interval Hx:      Review of Systems:  General:  GI: Tolerating POs without any N/V/D/ABD PAIN.  CV: No CP/SOB  ENDO: No S&Sx of hypoglycemia  MEDS:  dexamethasone     Tablet 3 milliGRAM(s) Oral three times a day  insulin glargine Injectable (LANTUS) 16 Unit(s) SubCutaneous at bedtime  insulin lispro (HumaLOG) corrective regimen sliding scale   SubCutaneous at bedtime  insulin lispro (HumaLOG) corrective regimen sliding scale   SubCutaneous three times a day with meals  insulin lispro Injectable (HumaLOG) 8 Unit(s) SubCutaneous three times a day before meals  simvastatin 40 milliGRAM(s) Oral at bedtime      Allergies    phenobarbital (Unknown)    Intolerances      PE:  General:  Vital Signs Last 24 Hrs  T(C): 36.8 (19 Oct 2019 08:39), Max: 37.1 (19 Oct 2019 04:33)  T(F): 98.3 (19 Oct 2019 08:39), Max: 98.7 (19 Oct 2019 04:33)  HR: 65 (19 Oct 2019 08:39) (53 - 80)  BP: 129/71 (19 Oct 2019 08:39) (102/65 - 134/82)  BP(mean): --  RR: 18 (19 Oct 2019 08:39) (17 - 18)  SpO2: 100% (19 Oct 2019 08:39) (96% - 100%)  Abd: Soft, NT,ND,   Extremities: Warm  Neuro: A&O X3    LABS:    POCT Blood Glucose.: 224 mg/dL (10-19-19 @ 08:45)  POCT Blood Glucose.: 262 mg/dL (10-18-19 @ 21:39)  POCT Blood Glucose.: 328 mg/dL (10-18-19 @ 17:22)  POCT Blood Glucose.: 349 mg/dL (10-18-19 @ 12:49)  POCT Blood Glucose.: 224 mg/dL (10-18-19 @ 08:19)  POCT Blood Glucose.: 313 mg/dL (10-17-19 @ 21:30)  POCT Blood Glucose.: 134 mg/dL (10-17-19 @ 17:30)  POCT Blood Glucose.: 260 mg/dL (10-17-19 @ 12:31)  POCT Blood Glucose.: 221 mg/dL (10-17-19 @ 08:45)  POCT Blood Glucose.: 213 mg/dL (10-16-19 @ 21:14)  POCT Blood Glucose.: 159 mg/dL (10-16-19 @ 17:32)  POCT Blood Glucose.: 215 mg/dL (10-16-19 @ 12:56)          10-19    131<L>  |  95<L>  |  18  ----------------------------<  321<H>  4.6   |  26  |  0.66    Ca    9.1      19 Oct 2019 10:51        Thyroid Function Tests:      Hemoglobin A1C, Whole Blood: 5.6 % [4.0 - 5.6] (10-11-19 @ 05:14)            Contact number: mari [phone] or [phone] Diabetes Follow up note:    Chief complaint/follow up: Type 2DM exacerbated by steroids    Interval Hx: Pt started on basal/bolus regimen yesterday. BG values remain in 200s but starting to trend down. Pt reports eating very well. Hoping that she will be discharged back to her Adult home. Pt has never used insulin in past.       Review of Systems:  General: as above.   GI: Tolerating POs without any N/V/D/ABD PAIN.  CV: No CP/SOB  ENDO: No S&Sx of hypoglycemia  MEDS:  dexamethasone     Tablet 3 milliGRAM(s) Oral three times a day  insulin glargine Injectable (LANTUS) 16 Unit(s) SubCutaneous at bedtime  insulin lispro (HumaLOG) corrective regimen sliding scale   SubCutaneous at bedtime  insulin lispro (HumaLOG) corrective regimen sliding scale   SubCutaneous three times a day with meals  insulin lispro Injectable (HumaLOG) 8 Unit(s) SubCutaneous three times a day before meals  simvastatin 40 milliGRAM(s) Oral at bedtime      Allergies    phenobarbital (Unknown)      PE:  General: Female lying in bed. NAD.   Vital Signs Last 24 Hrs  T(C): 36.8 (19 Oct 2019 08:39), Max: 37.1 (19 Oct 2019 04:33)  T(F): 98.3 (19 Oct 2019 08:39), Max: 98.7 (19 Oct 2019 04:33)  HR: 65 (19 Oct 2019 08:39) (53 - 80)  BP: 129/71 (19 Oct 2019 08:39) (102/65 - 134/82)  BP(mean): --  RR: 18 (19 Oct 2019 08:39) (17 - 18)  SpO2: 100% (19 Oct 2019 08:39) (96% - 100%)  Abd: Soft, NT,ND, Obese.   Extremities: Warm. no edema x 4 ext.   Neuro: A&O X3    LABS:    POCT Blood Glucose.: 224 mg/dL (10-19-19 @ 08:45)  POCT Blood Glucose.: 262 mg/dL (10-18-19 @ 21:39)  POCT Blood Glucose.: 328 mg/dL (10-18-19 @ 17:22)  POCT Blood Glucose.: 349 mg/dL (10-18-19 @ 12:49)  POCT Blood Glucose.: 224 mg/dL (10-18-19 @ 08:19)  POCT Blood Glucose.: 313 mg/dL (10-17-19 @ 21:30)  POCT Blood Glucose.: 134 mg/dL (10-17-19 @ 17:30)  POCT Blood Glucose.: 260 mg/dL (10-17-19 @ 12:31)  POCT Blood Glucose.: 221 mg/dL (10-17-19 @ 08:45)  POCT Blood Glucose.: 213 mg/dL (10-16-19 @ 21:14)  POCT Blood Glucose.: 159 mg/dL (10-16-19 @ 17:32)  POCT Blood Glucose.: 215 mg/dL (10-16-19 @ 12:56)          10-19    131<L>  |  95<L>  |  18  ----------------------------<  321<H>  4.6   |  26  |  0.66    Ca    9.1      19 Oct 2019 10:51          Hemoglobin A1C, Whole Blood: 5.6 % [4.0 - 5.6] (10-11-19 @ 05:14)            Contact number: mari [phone] or [phone]

## 2019-10-19 NOTE — PROGRESS NOTE ADULT - SUBJECTIVE AND OBJECTIVE BOX
Patient is a 59y old  Female who presents with a chief complaint of brain mass (19 Oct 2019 12:32)    SUBJECTIVE / OVERNIGHT EVENTS: Patient seen and examined. No acute overnight events, no subjective complaints.    OBJECTIVE:  Vital Signs Last 24 Hrs  T(C): 36.9 (19 Oct 2019 13:15), Max: 37.1 (19 Oct 2019 04:33)  T(F): 98.5 (19 Oct 2019 13:15), Max: 98.7 (19 Oct 2019 04:33)  HR: 67 (19 Oct 2019 13:15) (53 - 80)  BP: 114/80 (19 Oct 2019 13:15) (102/65 - 129/71)  BP(mean): --  RR: 18 (19 Oct 2019 13:15) (18 - 18)  SpO2: 100% (19 Oct 2019 13:15) (96% - 100%)    I&O's Summary    18 Oct 2019 07:01  -  19 Oct 2019 07:00  --------------------------------------------------------  IN: 360 mL / OUT: 0 mL / NET: 360 mL    19 Oct 2019 07:01  -  19 Oct 2019 15:08  --------------------------------------------------------  IN: 780 mL / OUT: 0 mL / NET: 780 mL      Physical Examination:  GEN: thin man, laying in stretcher in NAD  PSYCH: A&Ox3, mood and affect appear appropriate   SKIN: intact, no e/o rash  NEURO: no focal neurologic deficits appreciated; CN II-XII intact, sensation intact B/L, motor 5/5 in all mm groups  EYES: PERRL, anicteric, EOMI, no vertical/horizontal nystagmus  HEAD: NC, AT  NECK: supple  RESPI: no accessory muscle use, B/L air entry, CTAB   CARDIO: regular rate/rhythm, no LE edema B/L  ABD: soft, NT, ND, +BS  EXT: patient able to move all extremities spontaneously  VASC: peripheral pulses palpated    Labs:  CAPILLARY BLOOD GLUCOSE      POCT Blood Glucose.: 202 mg/dL (19 Oct 2019 12:32)  POCT Blood Glucose.: 224 mg/dL (19 Oct 2019 08:45)  POCT Blood Glucose.: 262 mg/dL (18 Oct 2019 21:39)  POCT Blood Glucose.: 328 mg/dL (18 Oct 2019 17:22)                          16.8   7.39  )-----------( 177      ( 19 Oct 2019 12:54 )             49.4     10-19    131<L>  |  95<L>  |  18  ----------------------------<  321<H>  4.6   |  26  |  0.66    Ca    9.1      19 Oct 2019 10:51                Imaging Personally Reviewed:    Consultant(s) Notes Reviewed:   Care Discussed with Consultants/Other Providers:    MEDICATIONS  (STANDING):  amLODIPine   Tablet 10 milliGRAM(s) Oral daily  dexamethasone     Tablet 3 milliGRAM(s) Oral three times a day  docusate sodium 100 milliGRAM(s) Oral three times a day  enalapril 10 milliGRAM(s) Oral daily  enoxaparin Injectable 40 milliGRAM(s) SubCutaneous daily  famotidine    Tablet 20 milliGRAM(s) Oral two times a day  insulin glargine Injectable (LANTUS) 20 Unit(s) SubCutaneous at bedtime  insulin lispro (HumaLOG) corrective regimen sliding scale   SubCutaneous at bedtime  insulin lispro (HumaLOG) corrective regimen sliding scale   SubCutaneous three times a day with meals  insulin lispro Injectable (HumaLOG) 10 Unit(s) SubCutaneous three times a day before meals  montelukast 10 milliGRAM(s) Oral daily  polyethylene glycol 3350 17 Gram(s) Oral daily  senna 2 Tablet(s) Oral at bedtime  simvastatin 40 milliGRAM(s) Oral at bedtime  valproic  acid Syrup 500 milliGRAM(s) Oral every 8 hours    MEDICATIONS  (PRN):  acetaminophen   Tablet .. 650 milliGRAM(s) Oral every 6 hours PRN Temp greater or equal to 38C (100.4F), Mild Pain (1 - 3)  ALBUTerol    90 MICROgram(s) HFA Inhaler 2 Puff(s) Inhalation every 6 hours PRN Bronchospasm Patient is a 59y old  Female who presents with a chief complaint of brain mass (19 Oct 2019 12:32)    SUBJECTIVE / OVERNIGHT EVENTS: Patient seen and examined. No acute overnight events, no subjective complaints.    OBJECTIVE:  Vital Signs Last 24 Hrs  T(F): 98.5 (19 Oct 2019 13:15), Max: 98.7 (19 Oct 2019 04:33)  HR: 67 (19 Oct 2019 13:15) (53 - 80)  BP: 114/80 (19 Oct 2019 13:15) (102/65 - 129/71)  RR: 18 (19 Oct 2019 13:15) (18 - 18)  SpO2: 100% (19 Oct 2019 13:15) (96% - 100%)    I&O's Summary  18 Oct 2019 07:01  -  19 Oct 2019 07:00  --------------------------------------------------------  IN: 360 mL / OUT: 0 mL / NET: 360 mL    19 Oct 2019 07:01  -  19 Oct 2019 15:08  --------------------------------------------------------  IN: 780 mL / OUT: 0 mL / NET: 780 mL    Physical Examination:  GEN: middle aged woman, laying in bed in NAD  PSYCH: A&Ox3, mood and affect appear appropriate   NEURO: no focal neurologic deficits appreciated  RESPI: no accessory muscle use, B/L air entry, CTAB   CARDIO: regular rate/rhythm, no LE edema B/L  ABD: soft, NT, ND, +BS  EXT: patient able to move all extremities spontaneously  VASC: peripheral pulses palpated    Labs:  CAPILLARY BLOOD GLUCOSE  POCT Blood Glucose.: 202 mg/dL (19 Oct 2019 12:32)  POCT Blood Glucose.: 224 mg/dL (19 Oct 2019 08:45)  POCT Blood Glucose.: 262 mg/dL (18 Oct 2019 21:39)  POCT Blood Glucose.: 328 mg/dL (18 Oct 2019 17:22)                        16.8   7.39  )-----------( 177      ( 19 Oct 2019 12:54 )             49.4     10-19  131<L>  |  95<L>  |  18  ----------------------------<  321<H>  4.6   |  26  |  0.66    Ca    9.1      19 Oct 2019 10:51    Consultant(s) Notes Reviewed: Endocrinology  Care Discussed with Consultants/Other Providers: COMPA Z[place]ala    MEDICATIONS  (STANDING):  amLODIPine   Tablet 10 milliGRAM(s) Oral daily  dexamethasone     Tablet 3 milliGRAM(s) Oral three times a day  docusate sodium 100 milliGRAM(s) Oral three times a day  enalapril 10 milliGRAM(s) Oral daily  enoxaparin Injectable 40 milliGRAM(s) SubCutaneous daily  famotidine    Tablet 20 milliGRAM(s) Oral two times a day  insulin glargine Injectable (LANTUS) 20 Unit(s) SubCutaneous at bedtime  insulin lispro (HumaLOG) corrective regimen sliding scale   SubCutaneous at bedtime  insulin lispro (HumaLOG) corrective regimen sliding scale   SubCutaneous three times a day with meals  insulin lispro Injectable (HumaLOG) 10 Unit(s) SubCutaneous three times a day before meals  montelukast 10 milliGRAM(s) Oral daily  polyethylene glycol 3350 17 Gram(s) Oral daily  senna 2 Tablet(s) Oral at bedtime  simvastatin 40 milliGRAM(s) Oral at bedtime  valproic  acid Syrup 500 milliGRAM(s) Oral every 8 hours    MEDICATIONS  (PRN):  acetaminophen   Tablet .. 650 milliGRAM(s) Oral every 6 hours PRN Temp greater or equal to 38C (100.4F), Mild Pain (1 - 3)  ALBUTerol    90 MICROgram(s) HFA Inhaler 2 Puff(s) Inhalation every 6 hours PRN Bronchospasm

## 2019-10-19 NOTE — PROGRESS NOTE ADULT - ASSESSMENT
59yoF who was transferred [place] for CT showing shift and vasogenic edema, and meningioma. MRI- 10/11/19- Multiple avidly enhancing extra-axial masses in the supratentorial and infratentorial regions consistent with the presence of multiple meningiomas. No vasogenic edema underlying these lesions. Invasion of the left transverse sinus and superior sagittal sinus. Similar rightward midline shift of 1.2 cm on the basis of chronic right MCA territory infarct with volume loss. No hydrocephalus or effacement of basal cisterns. CT c/a/p- 10/11- Multiple pelvic masses, likely adnexal in origin. Apparent supracervical hysterectomy. Appearance suggesting multiple spinal meningiomata. Pt has refused any further intervention. Course c/b steroid induced hyperglycemia and DVT (weekly monitoring for propagation.

## 2019-10-19 NOTE — PROGRESS NOTE ADULT - SUBJECTIVE AND OBJECTIVE BOX
SUBJECTIVE:   Patient was seen and evaluated at bedside. Patient is resting in bed and is in no new acute distress.   OVERNIGHT EVENTS:   none   Vital Signs Last 24 Hrs  T(C): 36.8 (19 Oct 2019 08:39), Max: 37.1 (19 Oct 2019 04:33)  T(F): 98.3 (19 Oct 2019 08:39), Max: 98.7 (19 Oct 2019 04:33)  HR: 65 (19 Oct 2019 08:39) (53 - 80)  BP: 129/71 (19 Oct 2019 08:39) (102/65 - 134/82)  BP(mean): --  RR: 18 (19 Oct 2019 08:39) (17 - 18)  SpO2: 100% (19 Oct 2019 08:39) (96% - 100%)    PHYSICAL EXAM:    General: No Acute Distress     Neurological: Awake, alert oriented to person, place and time, Following Commands, PERRL, EOMI, Face Symmetrical, Speech Fluent, Moving all extremities, Muscle Strength normal in all four extremities, No Drift, Sensation to Light Touch Intact    Pulmonary: Clear to Auscultation, No Rales, No Rhonchi, No Wheezes     Cardiovascular: S1, S2, Regular Rate and Rhythm     Gastrointestinal: Soft, Nontender, Nondistended     Incision:     LABS:         Hemoglobin A1C, Whole Blood: 5.6 % (10-11 @ 05:14)      10-18 @ 07:01  -  10-19 @ 07:00  --------------------------------------------------------  IN: 360 mL / OUT: 0 mL / NET: 360 mL      DRAINS:     MEDICATIONS:  Antibiotics:    Neuro:  acetaminophen   Tablet .. 650 milliGRAM(s) Oral every 6 hours PRN Temp greater or equal to 38C (100.4F), Mild Pain (1 - 3)  valproic  acid Syrup 500 milliGRAM(s) Oral every 8 hours    Cardiac:  amLODIPine   Tablet 10 milliGRAM(s) Oral daily  enalapril 10 milliGRAM(s) Oral daily    Pulm:  ALBUTerol    90 MICROgram(s) HFA Inhaler 2 Puff(s) Inhalation every 6 hours PRN Bronchospasm  montelukast 10 milliGRAM(s) Oral daily    GI/:  docusate sodium 100 milliGRAM(s) Oral three times a day  famotidine    Tablet 20 milliGRAM(s) Oral two times a day  polyethylene glycol 3350 17 Gram(s) Oral daily  senna 2 Tablet(s) Oral at bedtime    Other:   dexamethasone     Tablet 3 milliGRAM(s) Oral three times a day  enoxaparin Injectable 40 milliGRAM(s) SubCutaneous daily  insulin glargine Injectable (LANTUS) 16 Unit(s) SubCutaneous at bedtime  insulin lispro (HumaLOG) corrective regimen sliding scale   SubCutaneous at bedtime  insulin lispro (HumaLOG) corrective regimen sliding scale   SubCutaneous three times a day with meals  insulin lispro Injectable (HumaLOG) 8 Unit(s) SubCutaneous three times a day before meals  simvastatin 40 milliGRAM(s) Oral at bedtime    DIET: [] Regular [] CCD [] Renal [] Puree [] Dysphagia [] Tube Feeds: ccd diet     IMAGING:   no new imaging today

## 2019-10-19 NOTE — PROGRESS NOTE ADULT - PROBLEM SELECTOR PLAN 3
S/p IVC Filter
Steroid induced. A1C 5.6. Insulin management per Endocrinology.
Steroid induced. A1C 5.6. Insulin per Endocrinology.

## 2019-10-19 NOTE — PROGRESS NOTE ADULT - ASSESSMENT
HPI:  59 Y F transferred from [place] for CT showing shift and vasogenic edema, and meningioma. Per tx paperwork pt is from NH has hx of CVA and HTN was sent in to OSH today for AMS. Per documentation unclear what her baseline MS is. At OSH pt had CTH done that showed exta-axial masses, likely meningiomas, there is mass effect on the left ventricle with 1.3 cm left to right midline shift. She was given Keppra 100 mh, decadron 12mg, was agitated, got haldol 5mg and plan was to do MRI however tx here for neurosurgery. Remainder of hx limited 2/2 patients MS and no family here at the moment (10 Oct 2019 01:22)    PROCEDURE:    POD#    PLAN:  1 Patient refused to have any procedure   2 continue decadron   3 valproic acid for mood and seizure prevention-vpa level in am   4 continue amlodipine and enalapril for blood pressure   5 continue statin   6 ccd diet   7 lantus and hiss for glucose control   8 dvt ppx sql and scds   9 dispo-dc to group home on monday   Assessment:  Please Check When Present   []  GCS  E   V  M     Heart Failure: []Acute, [] acute on chronic , []chronic  Heart Failure:  [] Diastolic (HFpEF), [] Systolic (HFrEF), []Combined (HFpEF and HFrEF), [] RHF, [] Pulm HTN, [] Other    [] GLENNA, [] ATN, [] AIN, [] other  [] CKD1, [] CKD2, [] CKD 3, [] CKD 4, [] CKD 5, []ESRD    Encephalopathy: [] Metabolic, [] Hepatic, [] toxic, [] Neurological, [] Other    Abnormal Nurtitional Status: [] malnurtition (see nutrition note), [ ]underweight: BMI < 19, [] morbid obesity: BMI >40, [] Cachexia    [] Sepsis  [] hypovolemic shock,[] cardiogenic shock, [] hemorrhagic shock, [] neuogenic shock  [] Acute Respiratory Failure  []Cerebral edema, [] Brain compression/ herniation,   [] Functional quadriplegia  [] Acute blood loss anemia HPI:  59 Y F transferred from [place] for CT showing shift and vasogenic edema, and meningioma. Per tx paperwork pt is from NH has hx of CVA and HTN was sent in to OSH today for AMS. Per documentation unclear what her baseline MS is. At OSH pt had CTH done that showed exta-axial masses, likely meningiomas, there is mass effect on the left ventricle with 1.3 cm left to right midline shift. She was given Keppra 100 mh, decadron 12mg, was agitated, got haldol 5mg and plan was to do MRI however tx here for neurosurgery. Remainder of hx limited 2/2 patients MS and no family here at the moment (10 Oct 2019 01:22)    PROCEDURE:    POD#    PLAN:  1 Patient refused to have any procedure   2 continue decadron   3 valproic acid for mood and seizure prevention-vpa level in am   4 continue amlodipine and enalapril for blood pressure   5 continue statin   6 ccd diet   7 lantus and hiss for glucose control   8 dvt ppx sql and scds   9 dispo-dc to group home on monday   Assessment:  Please Check When Present   []  GCS  E   V  M     Heart Failure: []Acute, [] acute on chronic , []chronic  Heart Failure:  [] Diastolic (HFpEF), [] Systolic (HFrEF), []Combined (HFpEF and HFrEF), [] RHF, [] Pulm HTN, [] Other    [] GLENNA, [] ATN, [] AIN, [] other  [] CKD1, [] CKD2, [] CKD 3, [] CKD 4, [] CKD 5, []ESRD    Encephalopathy: [] Metabolic, [] Hepatic, [] toxic, [] Neurological, [] Other    Abnormal Nurtitional Status: [] malnurtition (see nutrition note), [ ]underweight: BMI < 19, [] morbid obesity: BMI >40, [] Cachexia    [] Sepsis  [] hypovolemic shock,[] cardiogenic shock, [] hemorrhagic shock, [] neuogenic shock  [] Acute Respiratory Failure  []Cerebral edema, [] Brain compression/ herniation,   [] Functional quadriplegia  [] Acute blood loss anemia  More than 30 minutes were spent educating the patient and family regarding condition, medications, follow up plans, signs and symptoms to be concerned with, preparing paperwork, and questions answered regarding discharge.

## 2019-10-19 NOTE — PROGRESS NOTE ADULT - PROBLEM SELECTOR PLAN 2
Continue Amlodipine and Enalapril
Doppler u/s- 10/10/19- Positive deep venous thrombosis above the left knee within the left popliteal vein. Positive DVT is also noted within the left calf at the left gastrocnemius vein.   Doppler u/s 10/18- Persistent DVT in the left tibial peroneal trunk and gastrocnemius veins. No evidence of propagation of thrombus.  Finding discussed with radiology attending Yovani Espino- no popliteal DVT seen on u/s. Continue prophylactic Lovenox. Monitor for propagation in 1 week.   If evidence of propagation- can be started on full dose anticoagulation per neurosurgery.
Doppler u/s- 10/10/19- Positive deep venous thrombosis above the left knee within the left popliteal vein. Positive DVT is also noted within the left calf at the left gastrocnemius vein.     Doppler u/s 10/18- Persistent DVT in the left tibial peroneal trunk and gastrocnemius veins. No evidence of propagation of thrombus.    Finding discussed with radiology attending Yovani Espino- no popliteal DVT seen on today's u/s. Continue prophylactic Lovenox. Monitor for propagation in 1 week.     If evidence of propagation- can be started on full dose anticoagulation per neurosurgery.

## 2019-10-19 NOTE — PROGRESS NOTE ADULT - PROBLEM SELECTOR PLAN 4
Continue with Sliding scale
Controlled on Norvasc, Enalapril. Continue to monitor.
Controlled on Norvasc, Enalapril.

## 2019-10-19 NOTE — PROVIDER CONTACT NOTE (OTHER) - BACKGROUND
Pt admitted for altered mental status. Multiple brain lesions on MRI and Multiple spine lesions on CT

## 2019-10-19 NOTE — PROVIDER CONTACT NOTE (OTHER) - ACTION/TREATMENT ORDERED:
No interventions at this time, will continue to monitor.
COMPA [place] reassessed at bedside. No interventions at this time.
NP and Dr. Kimball spoke with patient and son. Patient continues to refuse MRI

## 2019-10-20 LAB
ANION GAP SERPL CALC-SCNC: 11 MMOL/L — SIGNIFICANT CHANGE UP (ref 5–17)
BUN SERPL-MCNC: 19 MG/DL — SIGNIFICANT CHANGE UP (ref 7–23)
CALCIUM SERPL-MCNC: 8.6 MG/DL — SIGNIFICANT CHANGE UP (ref 8.4–10.5)
CHLORIDE SERPL-SCNC: 101 MMOL/L — SIGNIFICANT CHANGE UP (ref 96–108)
CO2 SERPL-SCNC: 27 MMOL/L — SIGNIFICANT CHANGE UP (ref 22–31)
CREAT SERPL-MCNC: 0.63 MG/DL — SIGNIFICANT CHANGE UP (ref 0.5–1.3)
GLUCOSE BLDC GLUCOMTR-MCNC: 208 MG/DL — HIGH (ref 70–99)
GLUCOSE BLDC GLUCOMTR-MCNC: 209 MG/DL — HIGH (ref 70–99)
GLUCOSE BLDC GLUCOMTR-MCNC: 232 MG/DL — HIGH (ref 70–99)
GLUCOSE BLDC GLUCOMTR-MCNC: 241 MG/DL — HIGH (ref 70–99)
GLUCOSE SERPL-MCNC: 219 MG/DL — HIGH (ref 70–99)
HCT VFR BLD CALC: 47.7 % — HIGH (ref 34.5–45)
HGB BLD-MCNC: 16.3 G/DL — HIGH (ref 11.5–15.5)
MCHC RBC-ENTMCNC: 30.4 PG — SIGNIFICANT CHANGE UP (ref 27–34)
MCHC RBC-ENTMCNC: 34.2 GM/DL — SIGNIFICANT CHANGE UP (ref 32–36)
MCV RBC AUTO: 88.8 FL — SIGNIFICANT CHANGE UP (ref 80–100)
PLATELET # BLD AUTO: 167 K/UL — SIGNIFICANT CHANGE UP (ref 150–400)
POTASSIUM SERPL-MCNC: 4.7 MMOL/L — SIGNIFICANT CHANGE UP (ref 3.5–5.3)
POTASSIUM SERPL-SCNC: 4.7 MMOL/L — SIGNIFICANT CHANGE UP (ref 3.5–5.3)
RBC # BLD: 5.37 M/UL — HIGH (ref 3.8–5.2)
RBC # FLD: 12.4 % — SIGNIFICANT CHANGE UP (ref 10.3–14.5)
SODIUM SERPL-SCNC: 139 MMOL/L — SIGNIFICANT CHANGE UP (ref 135–145)
VALPROATE SERPL-MCNC: 53 UG/ML — SIGNIFICANT CHANGE UP (ref 50–100)
WBC # BLD: 6.28 K/UL — SIGNIFICANT CHANGE UP (ref 3.8–10.5)
WBC # FLD AUTO: 6.28 K/UL — SIGNIFICANT CHANGE UP (ref 3.8–10.5)

## 2019-10-20 PROCEDURE — 99232 SBSQ HOSP IP/OBS MODERATE 35: CPT

## 2019-10-20 RX ORDER — DEXTROSE 50 % IN WATER 50 %
12.5 SYRINGE (ML) INTRAVENOUS ONCE
Refills: 0 | Status: DISCONTINUED | OUTPATIENT
Start: 2019-10-20 | End: 2019-10-21

## 2019-10-20 RX ORDER — DEXTROSE 50 % IN WATER 50 %
15 SYRINGE (ML) INTRAVENOUS ONCE
Refills: 0 | Status: DISCONTINUED | OUTPATIENT
Start: 2019-10-20 | End: 2019-10-21

## 2019-10-20 RX ORDER — INSULIN GLARGINE 100 [IU]/ML
24 INJECTION, SOLUTION SUBCUTANEOUS AT BEDTIME
Refills: 0 | Status: DISCONTINUED | OUTPATIENT
Start: 2019-10-20 | End: 2019-10-21

## 2019-10-20 RX ORDER — DEXTROSE 50 % IN WATER 50 %
25 SYRINGE (ML) INTRAVENOUS ONCE
Refills: 0 | Status: DISCONTINUED | OUTPATIENT
Start: 2019-10-20 | End: 2019-10-21

## 2019-10-20 RX ORDER — SODIUM CHLORIDE 9 MG/ML
1000 INJECTION, SOLUTION INTRAVENOUS
Refills: 0 | Status: DISCONTINUED | OUTPATIENT
Start: 2019-10-20 | End: 2019-10-21

## 2019-10-20 RX ORDER — GLUCAGON INJECTION, SOLUTION 0.5 MG/.1ML
1 INJECTION, SOLUTION SUBCUTANEOUS ONCE
Refills: 0 | Status: DISCONTINUED | OUTPATIENT
Start: 2019-10-20 | End: 2019-10-21

## 2019-10-20 RX ORDER — INSULIN LISPRO 100/ML
12 VIAL (ML) SUBCUTANEOUS
Refills: 0 | Status: DISCONTINUED | OUTPATIENT
Start: 2019-10-20 | End: 2019-10-21

## 2019-10-20 RX ADMIN — Medication 4: at 08:48

## 2019-10-20 RX ADMIN — Medication 500 MILLIGRAM(S): at 05:47

## 2019-10-20 RX ADMIN — Medication 3 MILLIGRAM(S): at 13:02

## 2019-10-20 RX ADMIN — FAMOTIDINE 20 MILLIGRAM(S): 10 INJECTION INTRAVENOUS at 16:56

## 2019-10-20 RX ADMIN — Medication 650 MILLIGRAM(S): at 21:45

## 2019-10-20 RX ADMIN — FAMOTIDINE 20 MILLIGRAM(S): 10 INJECTION INTRAVENOUS at 05:47

## 2019-10-20 RX ADMIN — Medication 500 MILLIGRAM(S): at 13:01

## 2019-10-20 RX ADMIN — INSULIN GLARGINE 24 UNIT(S): 100 INJECTION, SOLUTION SUBCUTANEOUS at 21:19

## 2019-10-20 RX ADMIN — Medication 500 MILLIGRAM(S): at 21:11

## 2019-10-20 RX ADMIN — Medication 4: at 17:43

## 2019-10-20 RX ADMIN — Medication 3 MILLIGRAM(S): at 21:11

## 2019-10-20 RX ADMIN — Medication 10 MILLIGRAM(S): at 05:47

## 2019-10-20 RX ADMIN — SIMVASTATIN 40 MILLIGRAM(S): 20 TABLET, FILM COATED ORAL at 21:11

## 2019-10-20 RX ADMIN — ENOXAPARIN SODIUM 40 MILLIGRAM(S): 100 INJECTION SUBCUTANEOUS at 13:01

## 2019-10-20 RX ADMIN — Medication 10 UNIT(S): at 08:48

## 2019-10-20 RX ADMIN — Medication 4: at 13:01

## 2019-10-20 RX ADMIN — MONTELUKAST 10 MILLIGRAM(S): 4 TABLET, CHEWABLE ORAL at 13:02

## 2019-10-20 RX ADMIN — Medication 3 MILLIGRAM(S): at 05:47

## 2019-10-20 RX ADMIN — Medication 10 UNIT(S): at 13:01

## 2019-10-20 RX ADMIN — Medication 650 MILLIGRAM(S): at 21:11

## 2019-10-20 RX ADMIN — AMLODIPINE BESYLATE 10 MILLIGRAM(S): 2.5 TABLET ORAL at 05:47

## 2019-10-20 RX ADMIN — Medication 12 UNIT(S): at 17:42

## 2019-10-20 NOTE — PROGRESS NOTE ADULT - ASSESSMENT
59 year old woman with PMH HTN, HLD, CVA, DM2, here for management of meningiomas, also with steroid induced hyperglycemia. BG goal 100-180 mg/dL. Pt remains on Decadron 3mg TID w/no further taper. Discussed need for insulin therapy while on steroids w/pt. Discussed avoidance of concentrated sweets and consistent carb diet for optimal glycemic control.

## 2019-10-20 NOTE — PROGRESS NOTE ADULT - SUBJECTIVE AND OBJECTIVE BOX
Diabetes Follow up note:    Chief complaint: Steroid induced hyperglycemia    Interval Hx: BG values remain above goal on current basal/bolus. Pt seen at bedside w/sister present. Looking forward to returning to her assisted living.     Review of Systems:  General: denies pain  GI: Tolerating POs. Denies N/V/D/Abd pain  CV: Denies CP/SOB  ENDO: No S&Sx of hypoglycemia  MEDS:  dexamethasone     Tablet 3 milliGRAM(s) Oral three times a day  insulin glargine Injectable (LANTUS) 20 Unit(s) SubCutaneous at bedtime  insulin lispro (HumaLOG) corrective regimen sliding scale   SubCutaneous at bedtime  insulin lispro (HumaLOG) corrective regimen sliding scale   SubCutaneous three times a day with meals  insulin lispro Injectable (HumaLOG) 10 Unit(s) SubCutaneous three times a day before meals  simvastatin 40 milliGRAM(s) Oral at bedtime      Allergies    phenobarbital (Unknown)        PE:  General: Female sitting in bed. NAD>   Vital Signs Last 24 Hrs  T(C): 36.7 (20 Oct 2019 12:58), Max: 36.8 (20 Oct 2019 08:30)  T(F): 98.1 (20 Oct 2019 12:58), Max: 98.2 (20 Oct 2019 08:30)  HR: 55 (20 Oct 2019 12:58) (55 - 66)  BP: 111/77 (20 Oct 2019 12:58) (103/65 - 123/68)  BP(mean): --  RR: 18 (20 Oct 2019 12:58) (18 - 18)  SpO2: 99% (20 Oct 2019 12:58) (92% - 100%)  Abd: Soft, NT,ND, Obese.   Extremities: Warm.   Neuro: A&O X3    LABS:  POCT Blood Glucose.: 209 mg/dL (10-20-19 @ 12:47)  POCT Blood Glucose.: 208 mg/dL (10-20-19 @ 08:36)  POCT Blood Glucose.: 188 mg/dL (10-19-19 @ 21:43)  POCT Blood Glucose.: 175 mg/dL (10-19-19 @ 17:33)  POCT Blood Glucose.: 202 mg/dL (10-19-19 @ 12:32)  POCT Blood Glucose.: 224 mg/dL (10-19-19 @ 08:45)  POCT Blood Glucose.: 262 mg/dL (10-18-19 @ 21:39)  POCT Blood Glucose.: 328 mg/dL (10-18-19 @ 17:22)  POCT Blood Glucose.: 349 mg/dL (10-18-19 @ 12:49)  POCT Blood Glucose.: 224 mg/dL (10-18-19 @ 08:19)  POCT Blood Glucose.: 313 mg/dL (10-17-19 @ 21:30)  POCT Blood Glucose.: 134 mg/dL (10-17-19 @ 17:30)                            16.3   6.28  )-----------( 167      ( 20 Oct 2019 08:48 )             47.7       10-20    139  |  101  |  19  ----------------------------<  219<H>  4.7   |  27  |  0.63    Ca    8.6      20 Oct 2019 05:49          Hemoglobin A1C, Whole Blood: 5.6 % [4.0 - 5.6] (10-11-19 @ 05:14)          Contact number: mari [phone] or [phone]

## 2019-10-20 NOTE — PROGRESS NOTE ADULT - SUBJECTIVE AND OBJECTIVE BOX
SUBJECTIVE: Pt seen and examined, doing well, awaiting discharge to rehab or assisted living facility, pt does not want any surgery    OVERNIGHT EVENTS: none    Vital Signs Last 24 Hrs  T(C): 36.8 (20 Oct 2019 08:30), Max: 36.9 (19 Oct 2019 13:15)  T(F): 98.2 (20 Oct 2019 08:30), Max: 98.5 (19 Oct 2019 13:15)  HR: 66 (20 Oct 2019 08:30) (58 - 67)  BP: 120/81 (20 Oct 2019 08:30) (103/65 - 123/68)  BP(mean): --  RR: 18 (20 Oct 2019 08:30) (18 - 18)  SpO2: 98% (20 Oct 2019 08:30) (92% - 100%)    PHYSICAL EXAM:    General: No Acute Distress     Neurological: Awake, alert oriented to person, place and time, Following Commands, PERRL, EOMI, Face Symmetrical, Speech Fluent, Moving all extremities, Muscle Strength normal in all four extremities, No Drift, Sensation to Light Touch Intact    Pulmonary: Clear to Auscultation, No Rales, No Rhonchi, No Wheezes     Cardiovascular: S1, S2, Regular Rate and Rhythm     Gastrointestinal: Soft, Nontender, Nondistended     Incision: none    LABS:                        16.3   6.28  )-----------( 167      ( 20 Oct 2019 08:48 )             47.7    10-20    139  |  101  |  19  ----------------------------<  219<H>  4.7   |  27  |  0.63    Ca    8.6      20 Oct 2019 05:49      Hemoglobin A1C, Whole Blood: 5.6 % (10-11 @ 05:14)      10-19 @ 07:01  -  10-20 @ 07:00  --------------------------------------------------------  IN: 780 mL / OUT: 0 mL / NET: 780 mL    10-20 @ 07:01  -  10-20 @ 12:03  --------------------------------------------------------  IN: 360 mL / OUT: 0 mL / NET: 360 mL      DRAINS: none    MEDICATIONS:  Antibiotics:    Neuro:  acetaminophen   Tablet .. 650 milliGRAM(s) Oral every 6 hours PRN Temp greater or equal to 38C (100.4F), Mild Pain (1 - 3)  valproic  acid Syrup 500 milliGRAM(s) Oral every 8 hours    Cardiac:  amLODIPine   Tablet 10 milliGRAM(s) Oral daily  enalapril 10 milliGRAM(s) Oral daily    Pulm:  ALBUTerol    90 MICROgram(s) HFA Inhaler 2 Puff(s) Inhalation every 6 hours PRN Bronchospasm  montelukast 10 milliGRAM(s) Oral daily    GI/:  docusate sodium 100 milliGRAM(s) Oral three times a day  famotidine    Tablet 20 milliGRAM(s) Oral two times a day  polyethylene glycol 3350 17 Gram(s) Oral daily  senna 2 Tablet(s) Oral at bedtime    Other:   dexamethasone     Tablet 3 milliGRAM(s) Oral three times a day  enoxaparin Injectable 40 milliGRAM(s) SubCutaneous daily  insulin glargine Injectable (LANTUS) 20 Unit(s) SubCutaneous at bedtime  insulin lispro (HumaLOG) corrective regimen sliding scale   SubCutaneous at bedtime  insulin lispro (HumaLOG) corrective regimen sliding scale   SubCutaneous three times a day with meals  insulin lispro Injectable (HumaLOG) 10 Unit(s) SubCutaneous three times a day before meals  simvastatin 40 milliGRAM(s) Oral at bedtime    DIET: [] Regular [] CCD [] Renal [] Puree [] Dysphagia [] Tube Feeds:     IMAGING: SUBJECTIVE: Pt seen and examined, doing well, awaiting discharge to rehab or assisted living facility, pt does not want any surgery    OVERNIGHT EVENTS: none    Vital Signs Last 24 Hrs  T(C): 36.8 (20 Oct 2019 08:30), Max: 36.9 (19 Oct 2019 13:15)  T(F): 98.2 (20 Oct 2019 08:30), Max: 98.5 (19 Oct 2019 13:15)  HR: 66 (20 Oct 2019 08:30) (58 - 67)  BP: 120/81 (20 Oct 2019 08:30) (103/65 - 123/68)  BP(mean): --  RR: 18 (20 Oct 2019 08:30) (18 - 18)  SpO2: 98% (20 Oct 2019 08:30) (92% - 100%)    PHYSICAL EXAM:    General: No Acute Distress     Neurological: Awake, alert oriented to person, place and time, Following Commands, PERRL, EOMI, Face Symmetrical, Speech Fluent, Moving all extremities, Muscle Strength normal in all four extremities, No Drift, Sensation to Light Touch Intact    Pulmonary: Clear to Auscultation, No Rales, No Rhonchi, No Wheezes     Cardiovascular: S1, S2, Regular Rate and Rhythm     Gastrointestinal: Soft, Nontender, Nondistended     Incision: none    LABS:                        16.3   6.28  )-----------( 167      ( 20 Oct 2019 08:48 )             47.7    10-20    139  |  101  |  19  ----------------------------<  219<H>  4.7   |  27  |  0.63    Ca    8.6      20 Oct 2019 05:49      Hemoglobin A1C, Whole Blood: 5.6 % (10-11 @ 05:14)      10-19 @ 07:01  -  10-20 @ 07:00  --------------------------------------------------------  IN: 780 mL / OUT: 0 mL / NET: 780 mL    10-20 @ 07:01  -  10-20 @ 12:03  --------------------------------------------------------  IN: 360 mL / OUT: 0 mL / NET: 360 mL      DRAINS: none    MEDICATIONS:  Antibiotics:    Neuro:  acetaminophen   Tablet .. 650 milliGRAM(s) Oral every 6 hours PRN Temp greater or equal to 38C (100.4F), Mild Pain (1 - 3)  valproic  acid Syrup 500 milliGRAM(s) Oral every 8 hours    Cardiac:  amLODIPine   Tablet 10 milliGRAM(s) Oral daily  enalapril 10 milliGRAM(s) Oral daily    Pulm:  ALBUTerol    90 MICROgram(s) HFA Inhaler 2 Puff(s) Inhalation every 6 hours PRN Bronchospasm  montelukast 10 milliGRAM(s) Oral daily    GI/:  docusate sodium 100 milliGRAM(s) Oral three times a day  famotidine    Tablet 20 milliGRAM(s) Oral two times a day  polyethylene glycol 3350 17 Gram(s) Oral daily  senna 2 Tablet(s) Oral at bedtime    Other:   dexamethasone     Tablet 3 milliGRAM(s) Oral three times a day  enoxaparin Injectable 40 milliGRAM(s) SubCutaneous daily  insulin glargine Injectable (LANTUS) 20 Unit(s) SubCutaneous at bedtime  insulin lispro (HumaLOG) corrective regimen sliding scale   SubCutaneous at bedtime  insulin lispro (HumaLOG) corrective regimen sliding scale   SubCutaneous three times a day with meals  insulin lispro Injectable (HumaLOG) 10 Unit(s) SubCutaneous three times a day before meals  simvastatin 40 milliGRAM(s) Oral at bedtime    DIET: [x] Regular [] CCD [] Renal [] Puree [] Dysphagia [] Tube Feeds:     IMAGING:   < from: MR Head w/wo IV Cont (10.11.19 @ 17:46) >  IMPRESSION:    Multiple avidly enhancing extra-axial masses in the supratentorial and   infratentorial regions as described, consistent with the presence of   multiple meningiomas. No vasogenic edema underlying these lesions.    Invasion of the left transverse sinus and superior sagittal sinus as   described.    Similar rightward midline shift of 1.2 cmon the basis of chronic right   MCA territory infarct with volume loss. No hydrocephalus or effacement of   basal cisterns.    No acute intracranial hemorrhage or acute territorial infarct.    < end of copied text >  < from: CT Abdomen and Pelvis w/ IV Cont (10.11.19 @ 10:37) >    IMPRESSION:       Multiple pelvic masses, likely adnexal in origin. Apparent supracervical   hysterectomy. Consider further evaluation with pelvic ultrasound.  Appearance suggesting multiple spinal meningiomata. Consider spinal MRI   as clinically indicated    < end of copied text >  < from: [facility] Duplex Lower Ext Vein Scan, Bilat (10.18.19 @ 11:53) >  IMPRESSION:     Persistent DVT in the left tibial peroneal trunk and gastrocnemius veins.   No evidence of propagation of thrombus.    < end of copied text >

## 2019-10-20 NOTE — PROGRESS NOTE ADULT - ASSESSMENT
59 Y F transferred from [place] for CT head showing shift and vasogenic edema, and meningioma. Per tx paperwork pt is from NH has hx of CVA and HTN, asthma, DVT s/p IVCF was sent in to OSH today for AMS. Per documentation unclear what her baseline MS is. At OSH pt had CTH done that showed exta-axial masses, likely meningiomas, there is mass effect on the left ventricle with 1.3 cm left to right midline shift. She was given Keppra 100 mh, decadron 12mg, was agitated, got haldol 5mg and plan was to do MRI however tx here for neurosurgery. Remainder of hx limited 2/2 patients MS and no family here at the moment     PLAN:  Neuro:   - MR brain- multiple brain lesions- likely meningiomas  - CT c/a/p- multiple spine lesions and multiple pelvic masses seen  - pt refusing surgery after speaking with Dr Anaya, patients son at bedside and in agreement with his mother  - refusing MR C-T-L spine   - continue VPA for seizure ppx  - continue decadron decrease to 2 q 12  Respiratory:   - encouraged incentive spirometry  - asthma- continue singulair, albuterol MDI prn  CV:  - HTN- continue amlodipine and enalapril  Endocrine:   - DMT2- continue fingersticks with SSC- on lantus 20 u with premeal humalog 10 u  DVT ppx:   - left calf DVT, serial dopplers on 10/18 shows no propagation of thrombus  - pt has IVCF  PT/OT:   HEATHER vs return to assisted living facility    Assessment:  Please Check When Present   []  GCS  E   V  M     Heart Failure: []Acute, [] acute on chronic , []chronic  Heart Failure:  [] Diastolic (HFpEF), [] Systolic (HFrEF), []Combined (HFpEF and HFrEF), [] RHF, [] Pulm HTN, [] Other    [] GLENNA, [] ATN, [] AIN, [] other  [] CKD1, [] CKD2, [] CKD 3, [] CKD 4, [] CKD 5, []ESRD    Encephalopathy: [] Metabolic, [] Hepatic, [] toxic, [] Neurological, [] Other    Abnormal Nurtitional Status: [] malnurtition (see nutrition note), [ ]underweight: BMI < 19, [] morbid obesity: BMI >40, [] Cachexia    [] Sepsis  [] hypovolemic shock,[] cardiogenic shock, [] hemorrhagic shock, [] neuogenic shock  [] Acute Respiratory Failure  []Cerebral edema, [] Brain compression/ herniation,   [] Functional quadriplegia  [] Acute blood loss anemia    Spectralink # [id number]

## 2019-10-21 ENCOUNTER — TRANSCRIPTION ENCOUNTER (OUTPATIENT)
Age: 59
End: 2019-10-21

## 2019-10-21 VITALS
SYSTOLIC BLOOD PRESSURE: 117 MMHG | TEMPERATURE: 98 F | OXYGEN SATURATION: 97 % | DIASTOLIC BLOOD PRESSURE: 67 MMHG | RESPIRATION RATE: 18 BRPM | HEART RATE: 60 BPM

## 2019-10-21 LAB
GLUCOSE BLDC GLUCOMTR-MCNC: 120 MG/DL — HIGH (ref 70–99)
GLUCOSE BLDC GLUCOMTR-MCNC: 147 MG/DL — HIGH (ref 70–99)

## 2019-10-21 PROCEDURE — 99239 HOSP IP/OBS DSCHRG MGMT >30: CPT

## 2019-10-21 PROCEDURE — 99232 SBSQ HOSP IP/OBS MODERATE 35: CPT

## 2019-10-21 RX ORDER — METFORMIN HYDROCHLORIDE 850 MG/1
1 TABLET ORAL
Qty: 0 | Refills: 0 | DISCHARGE

## 2019-10-21 RX ORDER — DEXAMETHASONE 0.5 MG/5ML
2 ELIXIR ORAL
Refills: 0 | Status: DISCONTINUED | OUTPATIENT
Start: 2019-10-21 | End: 2019-10-21

## 2019-10-21 RX ORDER — LINAGLIPTIN 5 MG/1
1 TABLET, FILM COATED ORAL
Qty: 0 | Refills: 0 | DISCHARGE

## 2019-10-21 RX ORDER — FAMOTIDINE 10 MG/ML
1 INJECTION INTRAVENOUS
Qty: 0 | Refills: 0 | DISCHARGE
Start: 2019-10-21

## 2019-10-21 RX ORDER — DOCUSATE SODIUM 100 MG
1 CAPSULE ORAL
Qty: 0 | Refills: 0 | DISCHARGE
Start: 2019-10-21

## 2019-10-21 RX ORDER — ACETAMINOPHEN 500 MG
2 TABLET ORAL
Qty: 0 | Refills: 0 | DISCHARGE
Start: 2019-10-21

## 2019-10-21 RX ORDER — DEXAMETHASONE 0.5 MG/5ML
1 ELIXIR ORAL
Qty: 0 | Refills: 0 | DISCHARGE
Start: 2019-10-21

## 2019-10-21 RX ORDER — AMLODIPINE BESYLATE 2.5 MG/1
1 TABLET ORAL
Qty: 0 | Refills: 0 | DISCHARGE

## 2019-10-21 RX ORDER — AMLODIPINE BESYLATE 2.5 MG/1
1 TABLET ORAL
Qty: 0 | Refills: 0 | DISCHARGE
Start: 2019-10-21

## 2019-10-21 RX ORDER — ENOXAPARIN SODIUM 100 MG/ML
40 INJECTION SUBCUTANEOUS
Qty: 0 | Refills: 0 | DISCHARGE

## 2019-10-21 RX ORDER — INSULIN LISPRO 100/ML
12 VIAL (ML) SUBCUTANEOUS
Qty: 0 | Refills: 0 | DISCHARGE

## 2019-10-21 RX ORDER — ASPIRIN/CALCIUM CARB/MAGNESIUM 324 MG
1 TABLET ORAL
Qty: 0 | Refills: 0 | DISCHARGE

## 2019-10-21 RX ORDER — SENNA PLUS 8.6 MG/1
2 TABLET ORAL
Qty: 0 | Refills: 0 | DISCHARGE
Start: 2019-10-21

## 2019-10-21 RX ORDER — INSULIN GLARGINE 100 [IU]/ML
24 INJECTION, SOLUTION SUBCUTANEOUS
Qty: 0 | Refills: 0 | DISCHARGE
Start: 2019-10-21

## 2019-10-21 RX ORDER — VALPROIC ACID (AS SODIUM SALT) 250 MG/5ML
500 SOLUTION, ORAL ORAL
Qty: 0 | Refills: 0 | DISCHARGE
Start: 2019-10-21

## 2019-10-21 RX ADMIN — Medication 12 UNIT(S): at 13:05

## 2019-10-21 RX ADMIN — ENOXAPARIN SODIUM 40 MILLIGRAM(S): 100 INJECTION SUBCUTANEOUS at 13:06

## 2019-10-21 RX ADMIN — Medication 100 MILLIGRAM(S): at 05:49

## 2019-10-21 RX ADMIN — POLYETHYLENE GLYCOL 3350 17 GRAM(S): 17 POWDER, FOR SOLUTION ORAL at 13:06

## 2019-10-21 RX ADMIN — MONTELUKAST 10 MILLIGRAM(S): 4 TABLET, CHEWABLE ORAL at 13:05

## 2019-10-21 RX ADMIN — Medication 12 UNIT(S): at 08:48

## 2019-10-21 RX ADMIN — Medication 3 MILLIGRAM(S): at 05:49

## 2019-10-21 RX ADMIN — Medication 100 MILLIGRAM(S): at 13:06

## 2019-10-21 RX ADMIN — Medication 500 MILLIGRAM(S): at 05:49

## 2019-10-21 RX ADMIN — Medication 500 MILLIGRAM(S): at 13:05

## 2019-10-21 RX ADMIN — FAMOTIDINE 20 MILLIGRAM(S): 10 INJECTION INTRAVENOUS at 05:49

## 2019-10-21 NOTE — PROGRESS NOTE ADULT - PROBLEM SELECTOR PLAN 1
-test BG AC/HS  -Increase Lantus 24 units QHS  -Increase Humalog 12 units TID w/meals  -c/w Humalog moderate correction scale AC and Mod HS scale  -No further Decadron taper at this time. Please notify endocrine team as steroids are tapered down.   -Will need insulin therapy while at assisted living facility and while on steroids.   -Please notify endocrine team when cleared for discharge for final recs.   pager: [phone]
Found to have Brain Tumors on F/u MRI  Neurologically stable  Patient is refusing surgery  Continue Valproic acid for Seizure prophylaxis, will ckeck level in am  Await Subacute rehab placement
Imaging results above. Pt is refusing further intervention. Further management per primary team, NSGY.
-test BG AC/HS  -Increase Lantus 20 units QHS  -Increase Humalog 10 units TID w/meals  -c/w Humalog moderate correction scale AC and Mod HS scale  -No further Decadron taper at this time. Please notify endocrine team as steroids are tapered down.   -Will need to verify that someone at home can administer insulin  -final discharge recs to be determined based on steroid plan and insulin requirements.   -discussed w/pt and Neuro Sjessi CHATMAN  pager: [phone]
-test BG AC/HS  -c/w Lantus 24 units QHS and Humalog 12 units TID w/meals, Humalog moderate correction scale AC and Mod HS scale while on Decadron 2 mg BID  -when decadron has been decreased to 2 mg po daily, decrease Lantus to 12 units qhs and Humalog to 6 units before meals. Two days after decadron has been stopped (on Saturday) - stop insulin and resume prior oral agents.  - consistent carb diet  - will follow if she remains inpatient. Can discharge to rehab on insulin taper described above.
MRI above. Pt is refusing further intervention.

## 2019-10-21 NOTE — DISCHARGE NOTE NURSING/CASE MANAGEMENT/SOCIAL WORK - NSDCPEPTSTRK_GEN_ALL_CORE
Risk factors for stroke/Need for follow up after discharge/Stroke education booklet/Stroke warning signs and symptoms/Call 911 for stroke/Stroke support groups for patients, families, and friends/Signs and symptoms of stroke/Prescribed medications

## 2019-10-21 NOTE — DISCHARGE NOTE PROVIDER - NSDCFUADDAPPT_GEN_ALL_CORE_FT
Please make appointment for follow up with your Primary Care Physician after rehab. Make an appointment for follow up with Dr Corrales in 2 weeks for lower extremity doppler to follow up your DVT.

## 2019-10-21 NOTE — PROGRESS NOTE ADULT - REASON FOR ADMISSION
brain mass
brain mass with below the knee DVT
brain mass; L popiteal DVT and gastrocnemius below the knee DVT and consulted for IVC filter placement
brain mass
multiple brain lesions
brain mass

## 2019-10-21 NOTE — PROGRESS NOTE ADULT - ATTENDING COMMENTS
Donna Aguilera MD   Pager # [phone]  On evenings and weekends, please call the office at [phone] or page endocrine fellow on call. Please note that this patient may be followed by different provider tomorrow. If no answer, contact the office.
D/c planning to rehab. Plan d/w [facility].

## 2019-10-21 NOTE — PROGRESS NOTE ADULT - SUBJECTIVE AND OBJECTIVE BOX
SUBJECTIVE: Pt seen and examined, no complaints, awaiting discharge to [facility] vs Assisted living facility    OVERNIGHT EVENTS: non    Vital Signs Last 24 Hrs  T(C): 36.4 (21 Oct 2019 04:00), Max: 37 (20 Oct 2019 19:40)  T(F): 97.6 (21 Oct 2019 04:00), Max: 98.6 (20 Oct 2019 19:40)  HR: 54 (21 Oct 2019 04:00) (51 - 61)  BP: 103/76 (21 Oct 2019 04:00) (103/76 - 121/74)  BP(mean): --  RR: 18 (21 Oct 2019 04:00) (18 - 19)  SpO2: 97% (21 Oct 2019 04:00) (97% - 99%)    PHYSICAL EXAM:    General: No Acute Distress     Neurological: Awake, alert oriented to person, place and time, Following Commands, PERRL, EOMI, Face Symmetrical, Speech Fluent, Moving all extremities, Muscle Strength normal in all four extremities, No Drift, Sensation to Light Touch Intact    Pulmonary: Clear to Auscultation, No Rales, No Rhonchi, No Wheezes     Cardiovascular: S1, S2, Regular Rate and Rhythm     Gastrointestinal: Soft, Nontender, Nondistended     Incision:     LABS:                        16.3   6.28  )-----------( 167      ( 20 Oct 2019 08:48 )             47.7    10-20    139  |  101  |  19  ----------------------------<  219<H>  4.7   |  27  |  0.63    Ca    8.6      20 Oct 2019 05:49      Hemoglobin A1C, Whole Blood: 5.6 % (10-11 @ 05:14)      10-20 @ 07:01  -  10-21 @ 07:00  --------------------------------------------------------  IN: 680 mL / OUT: 0 mL / NET: 680 mL      DRAINS:     MEDICATIONS:  Antibiotics:    Neuro:  acetaminophen   Tablet .. 650 milliGRAM(s) Oral every 6 hours PRN Temp greater or equal to 38C (100.4F), Mild Pain (1 - 3)  valproic  acid Syrup 500 milliGRAM(s) Oral every 8 hours    Cardiac:  amLODIPine   Tablet 10 milliGRAM(s) Oral daily  enalapril 10 milliGRAM(s) Oral daily    Pulm:  ALBUTerol    90 MICROgram(s) HFA Inhaler 2 Puff(s) Inhalation every 6 hours PRN Bronchospasm  montelukast 10 milliGRAM(s) Oral daily    GI/:  docusate sodium 100 milliGRAM(s) Oral three times a day  famotidine    Tablet 20 milliGRAM(s) Oral two times a day  polyethylene glycol 3350 17 Gram(s) Oral daily  senna 2 Tablet(s) Oral at bedtime    Other:   dexamethasone     Tablet 3 milliGRAM(s) Oral three times a day  dextrose 40% Gel 15 Gram(s) Oral once PRN Blood Glucose LESS THAN 70 milliGRAM(s)/deciLiter  dextrose 5%. 1000 milliLiter(s) IV Continuous <Continuous>  dextrose 50% Injectable 12.5 Gram(s) IV Push once  dextrose 50% Injectable 25 Gram(s) IV Push once  dextrose 50% Injectable 25 Gram(s) IV Push once  enoxaparin Injectable 40 milliGRAM(s) SubCutaneous daily  glucagon  Injectable 1 milliGRAM(s) IntraMuscular once PRN Glucose <70 milliGRAM(s)/deciLiter  insulin glargine Injectable (LANTUS) 24 Unit(s) SubCutaneous at bedtime  insulin lispro (HumaLOG) corrective regimen sliding scale   SubCutaneous at bedtime  insulin lispro (HumaLOG) corrective regimen sliding scale   SubCutaneous three times a day with meals  insulin lispro Injectable (HumaLOG) 12 Unit(s) SubCutaneous three times a day before meals  simvastatin 40 milliGRAM(s) Oral at bedtime    DIET: [] Regular [] CCD [] Renal [] Puree [] Dysphagia [] Tube Feeds:     IMAGING: SUBJECTIVE: Pt seen and examined, no complaints, awaiting discharge to [facility] vs Assisted living facility    OVERNIGHT EVENTS: non    Vital Signs Last 24 Hrs  T(C): 36.4 (21 Oct 2019 04:00), Max: 37 (20 Oct 2019 19:40)  T(F): 97.6 (21 Oct 2019 04:00), Max: 98.6 (20 Oct 2019 19:40)  HR: 54 (21 Oct 2019 04:00) (51 - 61)  BP: 103/76 (21 Oct 2019 04:00) (103/76 - 121/74)  BP(mean): --  RR: 18 (21 Oct 2019 04:00) (18 - 19)  SpO2: 97% (21 Oct 2019 04:00) (97% - 99%)    PHYSICAL EXAM:    General: No Acute Distress     Neurological: Awake, alert oriented to person, place and time, Following Commands, PERRL, EOMI, Face Symmetrical, Speech Fluent, Moving all extremities, Muscle Strength normal in all four extremities, No Drift, Sensation to Light Touch Intact    Pulmonary: Clear to Auscultation, No Rales, No Rhonchi, No Wheezes     Cardiovascular: S1, S2, Regular Rate and Rhythm     Gastrointestinal: Soft, Nontender, Nondistended     Incision: none    LABS:                        16.3   6.28  )-----------( 167      ( 20 Oct 2019 08:48 )             47.7    10-20    139  |  101  |  19  ----------------------------<  219<H>  4.7   |  27  |  0.63    Ca    8.6      20 Oct 2019 05:49      Hemoglobin A1C, Whole Blood: 5.6 % (10-11 @ 05:14)      10-20 @ 07:01  -  10-21 @ 07:00  --------------------------------------------------------  IN: 680 mL / OUT: 0 mL / NET: 680 mL      DRAINS: none    MEDICATIONS:  Antibiotics:    Neuro:  acetaminophen   Tablet .. 650 milliGRAM(s) Oral every 6 hours PRN Temp greater or equal to 38C (100.4F), Mild Pain (1 - 3)  valproic  acid Syrup 500 milliGRAM(s) Oral every 8 hours    Cardiac:  amLODIPine   Tablet 10 milliGRAM(s) Oral daily  enalapril 10 milliGRAM(s) Oral daily    Pulm:  ALBUTerol    90 MICROgram(s) HFA Inhaler 2 Puff(s) Inhalation every 6 hours PRN Bronchospasm  montelukast 10 milliGRAM(s) Oral daily    GI/:  docusate sodium 100 milliGRAM(s) Oral three times a day  famotidine    Tablet 20 milliGRAM(s) Oral two times a day  polyethylene glycol 3350 17 Gram(s) Oral daily  senna 2 Tablet(s) Oral at bedtime    Other:   dexamethasone     Tablet 3 milliGRAM(s) Oral three times a day  dextrose 40% Gel 15 Gram(s) Oral once PRN Blood Glucose LESS THAN 70 milliGRAM(s)/deciLiter  dextrose 5%. 1000 milliLiter(s) IV Continuous <Continuous>  dextrose 50% Injectable 12.5 Gram(s) IV Push once  dextrose 50% Injectable 25 Gram(s) IV Push once  dextrose 50% Injectable 25 Gram(s) IV Push once  enoxaparin Injectable 40 milliGRAM(s) SubCutaneous daily  glucagon  Injectable 1 milliGRAM(s) IntraMuscular once PRN Glucose <70 milliGRAM(s)/deciLiter  insulin glargine Injectable (LANTUS) 24 Unit(s) SubCutaneous at bedtime  insulin lispro (HumaLOG) corrective regimen sliding scale   SubCutaneous at bedtime  insulin lispro (HumaLOG) corrective regimen sliding scale   SubCutaneous three times a day with meals  insulin lispro Injectable (HumaLOG) 12 Unit(s) SubCutaneous three times a day before meals  simvastatin 40 milliGRAM(s) Oral at bedtime    DIET: [x] Regular [] CCD [] Renal [] Puree [] Dysphagia [] Tube Feeds:     IMAGING:   < from: MR Head w/wo IV Cont (10.11.19 @ 17:46) >  IMPRESSION:    Multiple avidly enhancing extra-axial masses in the supratentorial and   infratentorial regions as described, consistent with the presence of   multiple meningiomas. No vasogenic edema underlying these lesions.    Invasion of the left transverse sinus and superior sagittal sinus as   described.    Similar rightward midline shift of 1.2 cmon the basis of chronic right   MCA territory infarct with volume loss. No hydrocephalus or effacement of   basal cisterns.    No acute intracranial hemorrhage or acute territorial infarct.    < end of copied text >  < from: CT Abdomen and Pelvis w/ IV Cont (10.11.19 @ 10:37) >    IMPRESSION:       Multiple pelvic masses, likely adnexal in origin. Apparent supracervical   hysterectomy. Consider further evaluation with pelvic ultrasound.  Appearance suggesting multiple spinal meningiomata. Consider spinal MRI   as clinically indicated    < end of copied text >  < from: VA Duplex Lower Ext Vein Scan, Bilat (10.18.19 @ 11:53) >  IMPRESSION:     Persistent DVT in the left tibial peroneal trunk and gastrocnemius veins.   No evidence of propagation of thrombus.    < end of copied text >

## 2019-10-21 NOTE — DISCHARGE NOTE PROVIDER - NSDCCPCAREPLAN_GEN_ALL_CORE_FT
PRINCIPAL DISCHARGE DIAGNOSIS  Diagnosis: Brain mass  Assessment and Plan of Treatment: continue valproic acid for seizure prevention, patient does not want to have surgery      SECONDARY DISCHARGE DIAGNOSES  Diagnosis: H/O ischemic right MCA stroke  Assessment and Plan of Treatment: restart aspirin 81 mg    Diagnosis: DVT (deep venous thrombosis)  Assessment and Plan of Treatment: non propagating left calf DVT, continue chemoprophylaxis    Diagnosis: Steroid-induced hyperglycemia  Assessment and Plan of Treatment: continue fingersticks pre-meal and at bedtime with insulin coverage    Diagnosis: Essential hypertension  Assessment and Plan of Treatment: Essential hypertension    Diagnosis: COPD with asthma  Assessment and Plan of Treatment: COPD with asthma    Diagnosis: DM (diabetes mellitus)  Assessment and Plan of Treatment: DM (diabetes mellitus) PRINCIPAL DISCHARGE DIAGNOSIS  Diagnosis: Brain mass  Assessment and Plan of Treatment: Continue valproic acid for seizure prevention. Patient does not want to have surgery      SECONDARY DISCHARGE DIAGNOSES  Diagnosis: H/O ischemic right MCA stroke  Assessment and Plan of Treatment: Restart aspirin 81 mg    Diagnosis: DVT (deep venous thrombosis)  Assessment and Plan of Treatment: Non propagating left calf DVT, continue chemoprophylaxis, follow up with Dr Corrales for follow up doppler of lower extremities in 2 weeks    Diagnosis: Steroid-induced hyperglycemia  Assessment and Plan of Treatment: Continue fingersticks pre-meal and at bedtime with insulin coverage. Continue to wean off insulin as decadron dose decreases and restart patients home medications of metformin 500 bid and tradjenta 5 mg daily    Diagnosis: Essential hypertension  Assessment and Plan of Treatment: continue current medication    Diagnosis: COPD with asthma  Assessment and Plan of Treatment: continue current medicaiton    Diagnosis: DM (diabetes mellitus)  Assessment and Plan of Treatment: Continue fingersticks before meals and at bedtime with insulin coverage. Continue to wean off insulin as decadron dose decreases and restart patients home medications of metformin 500 bid and tradjenta 5 mg daily. PRINCIPAL DISCHARGE DIAGNOSIS  Diagnosis: Brain mass  Assessment and Plan of Treatment: Continue valproic acid for seizure prevention. Patient does not want to have surgery      SECONDARY DISCHARGE DIAGNOSES  Diagnosis: H/O ischemic right MCA stroke  Assessment and Plan of Treatment: Restart aspirin 81 mg    Diagnosis: DVT (deep venous thrombosis)  Assessment and Plan of Treatment: Non propagating left calf DVT, continue chemoprophylaxis, follow up with Dr Corrales for follow up doppler of lower extremities in 2 weeks    Diagnosis: Steroid-induced hyperglycemia  Assessment and Plan of Treatment: Continue fingersticks pre-meal and at bedtime with insulin coverage. Keep the same doses of insulin for Monday and Tuesday 10/21&10/22., (Lantus 24 u at bedtime and Humalog 12 u premeal) On Wednesday, Thursday and Friday give lantus 12 u at bedtime and Humalog 6u pre meal. Stop insulin on Saturday and restart patients home medications of metformin 500 bid and tradjenta 5 mg daily.    Diagnosis: Essential hypertension  Assessment and Plan of Treatment: continue current medication    Diagnosis: COPD with asthma  Assessment and Plan of Treatment: continue current medicaiton    Diagnosis: DM (diabetes mellitus)  Assessment and Plan of Treatment: Continue fingersticks before meals and at bedtime with insulin coverage. See above orders

## 2019-10-21 NOTE — PROGRESS NOTE ADULT - ASSESSMENT
59 Y F transferred from [place] for CT head showing shift and vasogenic edema, and meningioma. Per tx paperwork pt is from NH has hx of CVA and HTN, asthma, DVT s/p IVCF was sent in to OSH today for AMS. Per documentation unclear what her baseline MS is. At OSH pt had CTH done that showed exta-axial masses, likely meningiomas, there is mass effect on the left ventricle with 1.3 cm left to right midline shift. She was given Keppra 100 mh, decadron 12mg, was agitated, got haldol 5mg and plan was to do MRI however tx here for neurosurgery. Remainder of hx limited 2/2 patients MS and no family here at the moment     PLAN:  Neuro:   - MR brain- multiple brain lesions- likely meningiomas  - CT c/a/p- multiple spine lesions and multiple pelvic masses seen  - pt refusing surgery after speaking with Dr Anaya, patients son at bedside and in agreement with his mother  - refusing MR C-T-L spine   - continue VPA for seizure ppx  - continue decadron decrease to 2 q 12  Respiratory:   - encouraged incentive spirometry  - asthma- continue singulair, albuterol MDI prn  CV:  - HTN- continue amlodipine and enalapril  Endocrine:   - DMT2- continue fingersticks with SSC- on lantus 24 u bedtime, with premeal humalog 12 u  DVT ppx:   - left calf DVT, serial dopplers on 10/18 shows no propagation of thrombus  - pt has IVCF  PT/OT:   HEATHER vs return to assisted living facility    Assessment:  Please Check When Present   []  GCS  E   V  M     Heart Failure: []Acute, [] acute on chronic , []chronic  Heart Failure:  [] Diastolic (HFpEF), [] Systolic (HFrEF), []Combined (HFpEF and HFrEF), [] RHF, [] Pulm HTN, [] Other    [] GLENNA, [] ATN, [] AIN, [] other  [] CKD1, [] CKD2, [] CKD 3, [] CKD 4, [] CKD 5, []ESRD    Encephalopathy: [] Metabolic, [] Hepatic, [] toxic, [] Neurological, [] Other    Abnormal Nurtitional Status: [] malnurtition (see nutrition note), [ ]underweight: BMI < 19, [] morbid obesity: BMI >40, [] Cachexia    [] Sepsis  [] hypovolemic shock,[] cardiogenic shock, [] hemorrhagic shock, [] neuogenic shock  [] Acute Respiratory Failure  []Cerebral edema, [] Brain compression/ herniation,   [] Functional quadriplegia  [] Acute blood loss anemia    Spectralink # [id number] 59 Y F transferred from [place] for CT head showing shift and vasogenic edema, and meningioma. Per tx paperwork pt is from NH has hx of CVA and HTN, asthma, DVT s/p IVCF was sent in to OSH today for AMS. Per documentation unclear what her baseline MS is. At OSH pt had CTH done that showed exta-axial masses, likely meningiomas, there is mass effect on the left ventricle with 1.3 cm left to right midline shift. She was given Keppra 100 mh, decadron 12mg, was agitated, got haldol 5mg and plan was to do MRI however tx here for neurosurgery. Remainder of hx limited 2/2 patients MS and no family here at the moment     PLAN:  Neuro:   - MR brain- multiple brain lesions- likely meningiomas  - CT c/a/p- multiple spine lesions and multiple pelvic masses seen  - pt refusing surgery after speaking with Dr Anaya, patients son at bedside and in agreement with his mother  - refusing MR C-T-L spine   - continue VPA for seizure ppx  - continue decadron decrease to 2 q 12  Respiratory:   - encouraged incentive spirometry  - asthma- continue singulair, albuterol MDI prn  CV:  - HTN- continue amlodipine and enalapril  Endocrine:   - DMT2- continue fingersticks with SSC- on lantus 24 u bedtime, with premeal humalog 12 u  DVT ppx:   - left calf DVT, serial dopplers on 10/18 shows no propagation of thrombus  - pt has IVCF  PT/OT:   HEATHER vs return to assisted living facility  More than 30 minutes spent on total encounter: more than 50% of the visit was spent on educating the patient and family regarding condition, medications, follow up plans, signs and symptoms to be concerned with, preparing paperwork, and questions answered regarding discharge.      Assessment:  Please Check When Present   []  GCS  E   V  M     Heart Failure: []Acute, [] acute on chronic , []chronic  Heart Failure:  [] Diastolic (HFpEF), [] Systolic (HFrEF), []Combined (HFpEF and HFrEF), [] RHF, [] Pulm HTN, [] Other    [] GLENNA, [] ATN, [] AIN, [] other  [] CKD1, [] CKD2, [] CKD 3, [] CKD 4, [] CKD 5, []ESRD    Encephalopathy: [] Metabolic, [] Hepatic, [] toxic, [] Neurological, [] Other    Abnormal Nurtitional Status: [] malnurtition (see nutrition note), [ ]underweight: BMI < 19, [] morbid obesity: BMI >40, [] Cachexia    [] Sepsis  [] hypovolemic shock,[] cardiogenic shock, [] hemorrhagic shock, [] neuogenic shock  [] Acute Respiratory Failure  []Cerebral edema, [] Brain compression/ herniation,   [] Functional quadriplegia  [] Acute blood loss anemia    Spectralink # [id number]

## 2019-10-21 NOTE — PROGRESS NOTE ADULT - PROBLEM SELECTOR PROBLEM 1
Brain lesion
H/O ischemic right MCA stroke
Steroid-induced hyperglycemia
Steroid-induced hyperglycemia
Type 2 diabetes mellitus with hyperglycemia, without long-term current use of insulin
Brain lesion

## 2019-10-21 NOTE — DISCHARGE NOTE PROVIDER - NSDCACTIVITY_GEN_ALL_CORE
Do not drive or operate machinery/Showering allowed/Stairs allowed/Walking - Outdoors allowed/Walking - Indoors allowed/No heavy lifting/straining

## 2019-10-21 NOTE — DISCHARGE NOTE PROVIDER - PROVIDER TOKENS
PROVIDER:[TOKEN:[[id number]],FOLLOWUP:[2 weeks]],PROVIDER:[TOKEN:[[id number]],FOLLOWUP:[2 weeks]] PROVIDER:[TOKEN:[[id number]],FOLLOWUP:[2 weeks]],PROVIDER:[TOKEN:[[id number]],FOLLOWUP:[2 weeks]],PROVIDER:[TOKEN:[[id number]],FOLLOWUP:[2 weeks]]

## 2019-10-21 NOTE — PROGRESS NOTE ADULT - PROVIDER SPECIALTY LIST ADULT
Endocrinology
Hospitalist
Hospitalist
NSICU
Neurosurgery
Vascular Cardiology
Neurosurgery
Neurosurgery

## 2019-10-21 NOTE — PROGRESS NOTE ADULT - SUBJECTIVE AND OBJECTIVE BOX
Vascular Cardiology Consult Note     SPECTRA [id number]              EMAIL [email or web address]   OFFICE [phone]    CC: Meningioma with below the knee DVT      HPI:    59 Y F transferred from [place] for CT head showing shift and vasogenic edema, and meningioma. Per tx paperwork pt is from NH has hx of CVA and HTN, asthma, DVT s/p IVCF was sent in to OSH today for AMS. Per documentation unclear what her baseline MS is. At OSH pt had CTH done that showed exta-axial masses, likely meningiomas, there is mass effect on the left ventricle with 1.3 cm left to right midline shift. She was given Keppra 100 mh, decadron 12mg, was agitated, got haldol 5mg and plan was to do MRI however tx here for neurosurgery. Patient at this time is refusing any neurosurgical intervention. During the hospital course patient was found to have persistent DVT in the left tibial peroneal trunk and gastrocnemius veins based on US duplex on 10/18 and currently has an IVC filter placed.          Allergies  phenobarbital (Unknown)  Intolerances    	    MEDICATIONS:  amLODIPine   Tablet 10 milliGRAM(s) Oral daily  enalapril 10 milliGRAM(s) Oral daily  enoxaparin Injectable 40 milliGRAM(s) SubCutaneous daily  ALBUTerol    90 MICROgram(s) HFA Inhaler 2 Puff(s) Inhalation every 6 hours PRN  montelukast 10 milliGRAM(s) Oral daily  acetaminophen   Tablet .. 650 milliGRAM(s) Oral every 6 hours PRN  valproic  acid Syrup 500 milliGRAM(s) Oral every 8 hours  docusate sodium 100 milliGRAM(s) Oral three times a day  famotidine    Tablet 20 milliGRAM(s) Oral two times a day  polyethylene glycol 3350 17 Gram(s) Oral daily  senna 2 Tablet(s) Oral at bedtime  dexamethasone     Tablet 2 milliGRAM(s) Oral two times a day  dextrose 40% Gel 15 Gram(s) Oral once PRN  dextrose 50% Injectable 12.5 Gram(s) IV Push once  dextrose 50% Injectable 25 Gram(s) IV Push once  dextrose 50% Injectable 25 Gram(s) IV Push once  glucagon  Injectable 1 milliGRAM(s) IntraMuscular once PRN  insulin glargine Injectable (LANTUS) 24 Unit(s) SubCutaneous at bedtime  insulin lispro (HumaLOG) corrective regimen sliding scale   SubCutaneous at bedtime  insulin lispro (HumaLOG) corrective regimen sliding scale   SubCutaneous three times a day with meals  insulin lispro Injectable (HumaLOG) 12 Unit(s) SubCutaneous three times a day before meals  simvastatin 40 milliGRAM(s) Oral at bedtime  dextrose 5%. 1000 milliLiter(s) IV Continuous <Continuous>      PAST MEDICAL & SURGICAL HISTORY:  DM (diabetes mellitus)  HTN (hypertension)  H/O ischemic right MCA stroke  COPD with asthma      FAMILY HISTORY:      SOCIAL HISTORY:  unchanged    REVIEW OF SYSTEMS:  CONSTITUTIONAL: No fever, weight loss, or fatigue  EYES: No eye pain, visual disturbances, or discharge  ENMT:  No difficulty hearing, tinnitus, vertigo; No sinus or throat pain  NECK: No pain or stiffness  RESPIRATORY:  no shortness of breath, chest pain cough   CARDIOVASCULAR: no chest pain or shortness of breath palpaitation   GASTROINTESTINAL: No abdominal or epigastric pain. No nausea, vomiting, or hematemesis; No diarrhea or constipation. No melena or hematochezia.  GENITOURINARY: No dysuria, frequency, hematuria, or incontinence  NEUROLOGICAL: No headaches, memory loss, loss of strength, numbness, or tremors  SKIN:   LYMPH Nodes: No enlarged glands  ENDOCRINE: No heat or cold intolerance; No hair loss  MUSCULOSKELETAL: No joint pain or swelling; No muscle, back, or extremity pain  PSYCHIATRIC: No depression, anxiety, mood swings, or difficulty sleeping  HEME/LYMPH: No easy bruising, or bleeding gums  ALLERY AND IMMUNOLOGIC: No hives or eczema	    [ x] All others negative	  [ ] Unable to obtain    PHYSICAL EXAM:  T(C): 37.1 (10-21-19 @ 07:30), Max: 37.1 (10-21-19 @ 07:30)  HR: 58 (10-21-19 @ 07:30) (51 - 61)  BP: 112/68 (10-21-19 @ 07:30) (103/76 - 121/74)  RR: 18 (10-21-19 @ 07:30) (18 - 19)  SpO2: 98% (10-21-19 @ 07:30) (97% - 99%)  Wt(kg): --  I&O's Summary    20 Oct 2019 07:01  -  21 Oct 2019 07:00  --------------------------------------------------------  IN: 680 mL / OUT: 0 mL / NET: 680 mL        Appearance:  	  HEENT:   Normal oral mucosa, PERRL, EOMI	  Carotid: Right:    Left:    Lymphatic: No lymphadenopathy  Cardiovascular:    Respiratory:  	  Psychiatry:  AAO x   Gastrointestinal:  Soft, Non-tender, + BS	  Skin: No rashes, No ecchymoses, No cyanosis	  Neurologic:    Extremities:      Vascular Pulse Exam:  Right DP: []palpable []non-palpable []audible      Left DP :   []palpable []non-palpable []audible  Right PT: []palpable [] non-palpable []audible   Left PT:  [] palpable [] non-palpable []audible         Foot Exam:        LABS:	 	    CBC Full  -  ( 20 Oct 2019 08:48 )  WBC Count : 6.28 K/uL  Hemoglobin : 16.3 g/dL  Hematocrit : 47.7 %  Platelet Count - Automated : 167 K/uL  Mean Cell Volume : 88.8 fl  Mean Cell Hemoglobin : 30.4 pg  Mean Cell Hemoglobin Concentration : 34.2 gm/dL  Auto Neutrophil # : x  Auto Lymphocyte # : x  Auto Monocyte # : x  Auto Eosinophil # : x  Auto Basophil # : x  Auto Neutrophil % : x  Auto Lymphocyte % : x  Auto Monocyte % : x  Auto Eosinophil % : x  Auto Basophil % : x    10-20    139  |  101  |  19  ----------------------------<  219<H>  4.7   |  27  |  0.63    Ca    8.6      20 Oct 2019 05:49            Assessment:  59 Y F hx of CVA and HTN, asthma, DVT s/p IVCF was sent from [place] for AMS and CT head showing shift and vasogenic edema, and meningioma; consulted for below the knee DVT     1. Persistent left tibial peroneal trunk and gastrocnemius veins  - last study done on 10/18 shows no propagation of DVT   - IVC filter in place, non- retrievable  - on Lovenox 40mg SQ q daily     2. Multiple brain lesions likely meningiomas   - patient is refusing any intervention     Plan:  - recommend repeat US duplex of the bilateral lower extremities to assess for propagation but patient is being discharged in couple of hours may be done in 1-2 weeks to assess for this   - discharge to rehab with Lovenox 40mg SQ q daily and ASA 81mg po daily   - patient to follow up with Dr. Corrales in Vascular medicine clinic in 2 weeks to make a clinical decision based on repeat studies whether to continue with Lovenox (prophylaxis dosing) or a need for full dose AC     Thank you  Zunilda Singleton D.O.   Vascular Cardiology Service    Please call with any questions:   SPECTRA - [phone]  Office [phone]  email:  [email or web address] Vascular Cardiology Consult Note     SPECTRA [id number]              EMAIL [email or web address]   OFFICE [phone]    CC: Meningioma with below the knee DVT      HPI:    59 Y F transferred from [place] for CT head showing shift and vasogenic edema, and meningioma. Per tx paperwork pt is from NH has hx of CVA and HTN, asthma, DVT s/p IVCF was sent in to OSH today for AMS. Per documentation unclear what her baseline MS is. At OSH pt had CTH done that showed exta-axial masses, likely meningiomas, there is mass effect on the left ventricle with 1.3 cm left to right midline shift. She was given Keppra 100 mh, decadron 12mg, was agitated, got haldol 5mg and plan was to do MRI however tx here for neurosurgery. Patient at this time is refusing any neurosurgical intervention. During the hospital course patient was found to have persistent DVT in the left tibial peroneal trunk and gastrocnemius veins based on US duplex on 10/18 and currently has an IVC filter placed.          Allergies  phenobarbital (Unknown)  Intolerances    	    MEDICATIONS:  amLODIPine   Tablet 10 milliGRAM(s) Oral daily  enalapril 10 milliGRAM(s) Oral daily  enoxaparin Injectable 40 milliGRAM(s) SubCutaneous daily  ALBUTerol    90 MICROgram(s) HFA Inhaler 2 Puff(s) Inhalation every 6 hours PRN  montelukast 10 milliGRAM(s) Oral daily  acetaminophen   Tablet .. 650 milliGRAM(s) Oral every 6 hours PRN  valproic  acid Syrup 500 milliGRAM(s) Oral every 8 hours  docusate sodium 100 milliGRAM(s) Oral three times a day  famotidine    Tablet 20 milliGRAM(s) Oral two times a day  polyethylene glycol 3350 17 Gram(s) Oral daily  senna 2 Tablet(s) Oral at bedtime  dexamethasone     Tablet 2 milliGRAM(s) Oral two times a day  dextrose 40% Gel 15 Gram(s) Oral once PRN  dextrose 50% Injectable 12.5 Gram(s) IV Push once  dextrose 50% Injectable 25 Gram(s) IV Push once  dextrose 50% Injectable 25 Gram(s) IV Push once  glucagon  Injectable 1 milliGRAM(s) IntraMuscular once PRN  insulin glargine Injectable (LANTUS) 24 Unit(s) SubCutaneous at bedtime  insulin lispro (HumaLOG) corrective regimen sliding scale   SubCutaneous at bedtime  insulin lispro (HumaLOG) corrective regimen sliding scale   SubCutaneous three times a day with meals  insulin lispro Injectable (HumaLOG) 12 Unit(s) SubCutaneous three times a day before meals  simvastatin 40 milliGRAM(s) Oral at bedtime  dextrose 5%. 1000 milliLiter(s) IV Continuous <Continuous>      PAST MEDICAL & SURGICAL HISTORY:  DM (diabetes mellitus)  HTN (hypertension)  H/O ischemic right MCA stroke  COPD with asthma      FAMILY HISTORY:      SOCIAL HISTORY:  unchanged    REVIEW OF SYSTEMS:  CONSTITUTIONAL: No fever, weight loss, or fatigue  EYES: No eye pain, visual disturbances, or discharge  ENMT:  No difficulty hearing, tinnitus, vertigo; No sinus or throat pain  NECK: No pain or stiffness  RESPIRATORY:  no shortness of breath, chest pain cough   CARDIOVASCULAR: no chest pain or shortness of breath palpaitation   GASTROINTESTINAL: No abdominal or epigastric pain. No nausea, vomiting, or hematemesis; No diarrhea or constipation. No melena or hematochezia.  GENITOURINARY: No dysuria, frequency, hematuria, or incontinence  NEUROLOGICAL: No headaches, memory loss, loss of strength, numbness, or tremors  SKIN:   LYMPH Nodes: No enlarged glands  ENDOCRINE: No heat or cold intolerance; No hair loss  MUSCULOSKELETAL: No joint pain or swelling; No muscle, back, or extremity pain  PSYCHIATRIC: No depression, anxiety, mood swings, or difficulty sleeping  HEME/LYMPH: No easy bruising, or bleeding gums  ALLERY AND IMMUNOLOGIC: No hives or eczema	    [ x] All others negative	  [ ] Unable to obtain    PHYSICAL EXAM:  T(C): 37.1 (10-21-19 @ 07:30), Max: 37.1 (10-21-19 @ 07:30)  HR: 58 (10-21-19 @ 07:30) (51 - 61)  BP: 112/68 (10-21-19 @ 07:30) (103/76 - 121/74)  RR: 18 (10-21-19 @ 07:30) (18 - 19)  SpO2: 98% (10-21-19 @ 07:30) (97% - 99%)  Wt(kg): --  I&O's Summary    20 Oct 2019 07:01  -  21 Oct 2019 07:00  --------------------------------------------------------  IN: 680 mL / OUT: 0 mL / NET: 680 mL        Appearance:  	  HEENT:   Normal oral mucosa, PERRL, EOMI	  Carotid: Right:    Left:    Lymphatic: No lymphadenopathy  Cardiovascular:    Respiratory:  	  Psychiatry:  AAO x   Gastrointestinal:  Soft, Non-tender, + BS	  Skin: No rashes, No ecchymoses, No cyanosis	  Neurologic:    Extremities:      Vascular Pulse Exam:  Right DP: []palpable []non-palpable []audible      Left DP :   []palpable []non-palpable []audible  Right PT: []palpable [] non-palpable []audible   Left PT:  [] palpable [] non-palpable []audible         Foot Exam:        LABS:	 	    CBC Full  -  ( 20 Oct 2019 08:48 )  WBC Count : 6.28 K/uL  Hemoglobin : 16.3 g/dL  Hematocrit : 47.7 %  Platelet Count - Automated : 167 K/uL  Mean Cell Volume : 88.8 fl  Mean Cell Hemoglobin : 30.4 pg  Mean Cell Hemoglobin Concentration : 34.2 gm/dL  Auto Neutrophil # : x  Auto Lymphocyte # : x  Auto Monocyte # : x  Auto Eosinophil # : x  Auto Basophil # : x  Auto Neutrophil % : x  Auto Lymphocyte % : x  Auto Monocyte % : x  Auto Eosinophil % : x  Auto Basophil % : x    10-20    139  |  101  |  19  ----------------------------<  219<H>  4.7   |  27  |  0.63    Ca    8.6      20 Oct 2019 05:49            Assessment:  59 Y F hx of CVA and HTN, asthma, DVT s/p IVCF was sent from [place] for AMS and CT head showing shift and vasogenic edema, and meningioma; consulted for below the knee DVT     1. Persistent left tibial peroneal trunk and gastrocnemius veins  - last study done on 10/18 shows no propagation of DVT   - IVC filter in place, non- retrievable  - on Lovenox 40mg SQ q daily     2. Multiple brain lesions likely meningiomas   - patient is refusing any intervention     Plan:  - recommend repeat US duplex of the bilateral lower extremities to assess for propagation but patient is being discharged in couple of hours may be done in 1-2 weeks to assess for this   - discharge to rehab with Lovenox 40mg SQ q daily and ASA 81mg po daily   - will need to know bleeding risk with the ? meningiomas from Neurosurgerys' perspective   - patient to follow up with Dr. Corrales in Vascular medicine clinic in 2 weeks to make a clinical decision based on repeat studies whether to continue with Lovenox (prophylaxis dosing) or a need for full dose AC     Thank you  Zunilda Singleton D.O.   Vascular Cardiology Service    Please call with any questions:   SPECTRA - [phone]  Office [phone]  email:  [email or web address]

## 2019-10-21 NOTE — DISCHARGE NOTE PROVIDER - CARE PROVIDER_API CALL
Dionte Anaya (MD)  Neurosurgery  General  [address] [facility], [address] [place] [address]  Phone: [phone]  Fax: [phone]  Follow Up Time: 2 weeks    Jose C Corrales (DO)  Internal Medicine; Nuclear Cardiology  [address] [place] [address]  Phone: [phone]  Fax: [phone]  Follow Up Time: 2 weeks Dionte Anaya)  Neurosurgery  General  [address], [address] [place] [address]  Phone: [phone]  Fax: [phone]  Follow Up Time: 2 weeks    Jose C Corrales (DO)  Internal Medicine; Nuclear Cardiology  [address] [place] [address]  Phone: [phone]  Fax: [phone]  Follow Up Time: 2 weeks    Donna Aguilera)  Internal Medicine  [address] [place] [address], [facility] [address]  [place] [address]  Phone: [phone]  Follow Up Time: 2 weeks

## 2019-10-21 NOTE — DISCHARGE NOTE NURSING/CASE MANAGEMENT/SOCIAL WORK - PATIENT PORTAL LINK FT
You can access the FollowMyHealth Patient Portal offered by [facility] by registering at the following website: [email or web address]. By joining [facility]’s FollowMyHealth portal, you will also be able to view your health information using other applications (apps) compatible with our system.

## 2019-10-21 NOTE — PROGRESS NOTE ADULT - ASSESSMENT
59 year old woman with PMH HTN, HLD, CVA, DM2, here for management of meningiomas, also with steroid induced hyperglycemia. BG goal 100-180 mg/dL. Patient on decadron taper. Patjent to be discharged to rehab today.

## 2019-10-21 NOTE — DISCHARGE NOTE PROVIDER - HOSPITAL COURSE
59 Y F transferred from [place] for CT head showing shift and vasogenic edema, and multiple meningiomas. Per tx paperwork pt is from NH has hx of CVA and HTN, asthma, DVT s/p IVCF, was sent in to OSH today for AMS. Per documentation unclear what her baseline MS is. At OSH pt had CTH done that showed exta-axial masses, likely meningiomas, there is mass effect on the left ventricle with 1.3 cm left to right midline shift. She was given Keppra, decadron 12mg, was agitated, got haldol 5mg and plan was to do MRI however tx here for neurosurgery. Remainder of hx limited 2/2 patients MS and no family here at the moment     MR brain revealed multiple brain lesions- likely meningiomas. CT c/a/p showed multiple spine lesions and multiple pelvic masses. Patient and patient's son spoke with Neurosurgeon Dr Anaya and she is refusing surgery as well an any further workup. Patient's son is in agreement with his mother. Pt started on decadron for brain lesions and required insulin for glucose management. Pt with history of left DVT, has IVCF. Serial doppler showed no propagation. Pt is recommended for Subacute rehab. Pt is medically and neurologically stable for discharge.

## 2019-10-21 NOTE — PROGRESS NOTE ADULT - SUBJECTIVE AND OBJECTIVE BOX
Chief Complaint:     History:    MEDICATIONS  (STANDING):  amLODIPine   Tablet 10 milliGRAM(s) Oral daily  dexamethasone     Tablet 2 milliGRAM(s) Oral two times a day  dextrose 5%. 1000 milliLiter(s) (50 mL/Hr) IV Continuous <Continuous>  dextrose 50% Injectable 12.5 Gram(s) IV Push once  dextrose 50% Injectable 25 Gram(s) IV Push once  dextrose 50% Injectable 25 Gram(s) IV Push once  docusate sodium 100 milliGRAM(s) Oral three times a day  enalapril 10 milliGRAM(s) Oral daily  enoxaparin Injectable 40 milliGRAM(s) SubCutaneous daily  famotidine    Tablet 20 milliGRAM(s) Oral two times a day  insulin glargine Injectable (LANTUS) 24 Unit(s) SubCutaneous at bedtime  insulin lispro (HumaLOG) corrective regimen sliding scale   SubCutaneous at bedtime  insulin lispro (HumaLOG) corrective regimen sliding scale   SubCutaneous three times a day with meals  insulin lispro Injectable (HumaLOG) 12 Unit(s) SubCutaneous three times a day before meals  montelukast 10 milliGRAM(s) Oral daily  polyethylene glycol 3350 17 Gram(s) Oral daily  senna 2 Tablet(s) Oral at bedtime  simvastatin 40 milliGRAM(s) Oral at bedtime  valproic  acid Syrup 500 milliGRAM(s) Oral every 8 hours    MEDICATIONS  (PRN):  acetaminophen   Tablet .. 650 milliGRAM(s) Oral every 6 hours PRN Temp greater or equal to 38C (100.4F), Mild Pain (1 - 3)  ALBUTerol    90 MICROgram(s) HFA Inhaler 2 Puff(s) Inhalation every 6 hours PRN Bronchospasm  dextrose 40% Gel 15 Gram(s) Oral once PRN Blood Glucose LESS THAN 70 milliGRAM(s)/deciLiter  glucagon  Injectable 1 milliGRAM(s) IntraMuscular once PRN Glucose <70 milliGRAM(s)/deciLiter      Allergies    phenobarbital (Unknown)    Intolerances      Review of Systems:  Constitutional: No fever  Eyes: No blurry vision  Neuro: No tremors  HEENT: No pain  Cardiovascular: No chest pain, palpitations  Respiratory: No SOB, no cough  GI: No nausea, vomiting, abdominal pain  : No dysuria  Skin: no rash  Psych: no depression  Endocrine: no polyuria, polydipsia  Hem/lymph: no swelling  Osteoporosis: no fractures    ALL OTHER SYSTEMS REVIEWED AND NEGATIVE    UNABLE TO OBTAIN    PHYSICAL EXAM:  VITALS: T(C): 37.1 (10-21-19 @ 07:30)  T(F): 98.8 (10-21-19 @ 07:30), Max: 98.8 (10-21-19 @ 07:30)  HR: 58 (10-21-19 @ 07:30) (51 - 61)  BP: 112/68 (10-21-19 @ 07:30) (103/76 - 121/74)  RR:  (18 - 19)  SpO2:  (97% - 98%)  Wt(kg): --  GENERAL: NAD, well-groomed, well-developed  EYES: No proptosis, no lid lag, anicteric  HEENT:  Atraumatic, Normocephalic, moist mucous membranes  THYROID: Normal size, no palpable nodules  RESPIRATORY: Clear to auscultation bilaterally; No rales, rhonchi, wheezing, or rubs  CARDIOVASCULAR: Regular rate and rhythm; No murmurs; no peripheral edema  GI: Soft, nontender, non distended, normal bowel sounds  SKIN: Dry, intact, No rashes or lesions  MUSCULOSKELETAL: Full range of motion, normal strength  NEURO: sensation intact, extraocular movements intact, no tremor, normal reflexes  PSYCH: Alert and oriented x 3, normal affect, normal mood    POCT Blood Glucose.: 120 mg/dL (10-21-19 @ 11:57)  POCT Blood Glucose.: 147 mg/dL (10-21-19 @ 08:36)  POCT Blood Glucose.: 232 mg/dL (10-20-19 @ 21:15)  POCT Blood Glucose.: 241 mg/dL (10-20-19 @ 17:34)  POCT Blood Glucose.: 209 mg/dL (10-20-19 @ 12:47)  POCT Blood Glucose.: 208 mg/dL (10-20-19 @ 08:36)  POCT Blood Glucose.: 188 mg/dL (10-19-19 @ 21:43)  POCT Blood Glucose.: 175 mg/dL (10-19-19 @ 17:33)  POCT Blood Glucose.: 202 mg/dL (10-19-19 @ 12:32)  POCT Blood Glucose.: 224 mg/dL (10-19-19 @ 08:45)  POCT Blood Glucose.: 262 mg/dL (10-18-19 @ 21:39)  POCT Blood Glucose.: 328 mg/dL (10-18-19 @ 17:22)      10-20    139  |  101  |  19  ----------------------------<  219<H>  4.7   |  27  |  0.63    EGFR if : 115  EGFR if non : 100    Ca    8.6      10-20          Hemoglobin A1C, Whole Blood: 5.6 % [4.0 - 5.6] (10-11-19 @ 05:14) Chief Complaint: f/u DM2 and steroid induced hyperglycemia     History:  Patient seen this morning. She was tearful at bedside as she was initially told that she cannot go back to her assisted living facility due to pharmacy issues. Now will be discharged to rehab. Eating well, does not have nausea, vomiting, abdominal pain. States that she does snack on cheese and a few crackers in between meals. Currently on decadron 2 mg BID - as per neurosurgery PA, patient will be on this dose for two days, then decadron 2 mg po daily x 2 days - decadron will then be discontinued.     MEDICATIONS  (STANDING):  amLODIPine   Tablet 10 milliGRAM(s) Oral daily  dexamethasone     Tablet 2 milliGRAM(s) Oral two times a day  dextrose 5%. 1000 milliLiter(s) (50 mL/Hr) IV Continuous <Continuous>  dextrose 50% Injectable 12.5 Gram(s) IV Push once  dextrose 50% Injectable 25 Gram(s) IV Push once  dextrose 50% Injectable 25 Gram(s) IV Push once  docusate sodium 100 milliGRAM(s) Oral three times a day  enalapril 10 milliGRAM(s) Oral daily  enoxaparin Injectable 40 milliGRAM(s) SubCutaneous daily  famotidine    Tablet 20 milliGRAM(s) Oral two times a day  insulin glargine Injectable (LANTUS) 24 Unit(s) SubCutaneous at bedtime  insulin lispro (HumaLOG) corrective regimen sliding scale   SubCutaneous at bedtime  insulin lispro (HumaLOG) corrective regimen sliding scale   SubCutaneous three times a day with meals  insulin lispro Injectable (HumaLOG) 12 Unit(s) SubCutaneous three times a day before meals  montelukast 10 milliGRAM(s) Oral daily  polyethylene glycol 3350 17 Gram(s) Oral daily  senna 2 Tablet(s) Oral at bedtime  simvastatin 40 milliGRAM(s) Oral at bedtime  valproic  acid Syrup 500 milliGRAM(s) Oral every 8 hours    MEDICATIONS  (PRN):  acetaminophen   Tablet .. 650 milliGRAM(s) Oral every 6 hours PRN Temp greater or equal to 38C (100.4F), Mild Pain (1 - 3)  ALBUTerol    90 MICROgram(s) HFA Inhaler 2 Puff(s) Inhalation every 6 hours PRN Bronchospasm  dextrose 40% Gel 15 Gram(s) Oral once PRN Blood Glucose LESS THAN 70 milliGRAM(s)/deciLiter  glucagon  Injectable 1 milliGRAM(s) IntraMuscular once PRN Glucose <70 milliGRAM(s)/deciLiter      Allergies  phenobarbital (Unknown)    PHYSICAL EXAM:  VITALS: T(C): 37.1 (10-21-19 @ 07:30)  T(F): 98.8 (10-21-19 @ 07:30), Max: 98.8 (10-21-19 @ 07:30)  HR: 58 (10-21-19 @ 07:30) (51 - 61)  BP: 112/68 (10-21-19 @ 07:30) (103/76 - 121/74)  RR:  (18 - 19)  SpO2:  (97% - 98%)  Wt(kg): --  GENERAL: NAD, well-developed  RESPIRATORY: Clear to auscultation bilaterally; No rales, rhonchi, wheezing, or rubs  CARDIOVASCULAR: Regular rate and rhythm; No murmurs  GI: Soft, nontender, non distended  PSYCH: Alert and oriented x 3, tearful affect    POCT Blood Glucose.: 120 mg/dL (10-21-19 @ 11:57)  POCT Blood Glucose.: 147 mg/dL (10-21-19 @ 08:36)  POCT Blood Glucose.: 232 mg/dL (10-20-19 @ 21:15)  POCT Blood Glucose.: 241 mg/dL (10-20-19 @ 17:34)  POCT Blood Glucose.: 209 mg/dL (10-20-19 @ 12:47)  POCT Blood Glucose.: 208 mg/dL (10-20-19 @ 08:36)  POCT Blood Glucose.: 188 mg/dL (10-19-19 @ 21:43)  POCT Blood Glucose.: 175 mg/dL (10-19-19 @ 17:33)  POCT Blood Glucose.: 202 mg/dL (10-19-19 @ 12:32)  POCT Blood Glucose.: 224 mg/dL (10-19-19 @ 08:45)  POCT Blood Glucose.: 262 mg/dL (10-18-19 @ 21:39)  POCT Blood Glucose.: 328 mg/dL (10-18-19 @ 17:22)      10-20    139  |  101  |  19  ----------------------------<  219<H>  4.7   |  27  |  0.63    EGFR if : 115  EGFR if non : 100    Ca    8.6      10-20        Hemoglobin A1C, Whole Blood: 5.6 % [4.0 - 5.6] (10-11-19 @ 05:14)

## 2019-10-21 NOTE — DISCHARGE NOTE PROVIDER - NSDCFUADDINST_GEN_ALL_CORE_FT
Return to ER for fever, chills, nausea or vomiting, severe headache or pain not relieved with pain medication, sluggishness or change in mental status, or any weakness of extremities.   You may shower.   No driving until cleared by your surgeon. Do not return to work until cleared by surgeon.

## 2019-10-21 NOTE — DISCHARGE NOTE PROVIDER - CARE PROVIDERS DIRECT ADDRESSES
,[email or web address]

## 2019-10-23 PROBLEM — J44.9 CHRONIC OBSTRUCTIVE PULMONARY DISEASE, UNSPECIFIED: Chronic | Status: ACTIVE | Noted: 2019-10-10

## 2019-10-23 PROBLEM — I10 ESSENTIAL (PRIMARY) HYPERTENSION: Chronic | Status: ACTIVE | Noted: 2019-10-10

## 2019-10-23 PROBLEM — Z86.73 PERSONAL HISTORY OF TRANSIENT ISCHEMIC ATTACK (TIA), AND CEREBRAL INFARCTION WITHOUT RESIDUAL DEFICITS: Chronic | Status: ACTIVE | Noted: 2019-10-10

## 2019-10-23 PROBLEM — E11.9 TYPE 2 DIABETES MELLITUS WITHOUT COMPLICATIONS: Chronic | Status: ACTIVE | Noted: 2019-10-10

## 2019-10-29 PROBLEM — Z00.00 ENCOUNTER FOR PREVENTIVE HEALTH EXAMINATION: Status: ACTIVE | Noted: 2019-10-29

## 2019-11-04 ENCOUNTER — APPOINTMENT (OUTPATIENT)
Dept: NEUROSURGERY | Facility: CLINIC | Age: 59
End: 2019-11-04

## 2019-11-12 PROCEDURE — 94640 AIRWAY INHALATION TREATMENT: CPT

## 2019-11-12 PROCEDURE — 85379 FIBRIN DEGRADATION QUANT: CPT

## 2019-11-12 PROCEDURE — 85014 HEMATOCRIT: CPT

## 2019-11-12 PROCEDURE — 85610 PROTHROMBIN TIME: CPT

## 2019-11-12 PROCEDURE — 86901 BLOOD TYPING SEROLOGIC RH(D): CPT

## 2019-11-12 PROCEDURE — 87581 M.PNEUMON DNA AMP PROBE: CPT

## 2019-11-12 PROCEDURE — 85730 THROMBOPLASTIN TIME PARTIAL: CPT

## 2019-11-12 PROCEDURE — 82962 GLUCOSE BLOOD TEST: CPT

## 2019-11-12 PROCEDURE — 87486 CHLMYD PNEUM DNA AMP PROBE: CPT

## 2019-11-12 PROCEDURE — 82947 ASSAY GLUCOSE BLOOD QUANT: CPT

## 2019-11-12 PROCEDURE — 71260 CT THORAX DX C+: CPT

## 2019-11-12 PROCEDURE — 87633 RESP VIRUS 12-25 TARGETS: CPT

## 2019-11-12 PROCEDURE — 84295 ASSAY OF SERUM SODIUM: CPT

## 2019-11-12 PROCEDURE — 82330 ASSAY OF CALCIUM: CPT

## 2019-11-12 PROCEDURE — 99285 EMERGENCY DEPT VISIT HI MDM: CPT | Mod: 25

## 2019-11-12 PROCEDURE — 97530 THERAPEUTIC ACTIVITIES: CPT

## 2019-11-12 PROCEDURE — 87086 URINE CULTURE/COLONY COUNT: CPT

## 2019-11-12 PROCEDURE — 93970 EXTREMITY STUDY: CPT

## 2019-11-12 PROCEDURE — 85027 COMPLETE CBC AUTOMATED: CPT

## 2019-11-12 PROCEDURE — 84100 ASSAY OF PHOSPHORUS: CPT

## 2019-11-12 PROCEDURE — 81001 URINALYSIS AUTO W/SCOPE: CPT

## 2019-11-12 PROCEDURE — 82803 BLOOD GASES ANY COMBINATION: CPT

## 2019-11-12 PROCEDURE — 86850 RBC ANTIBODY SCREEN: CPT

## 2019-11-12 PROCEDURE — 74177 CT ABD & PELVIS W/CONTRAST: CPT

## 2019-11-12 PROCEDURE — 84702 CHORIONIC GONADOTROPIN TEST: CPT

## 2019-11-12 PROCEDURE — 82435 ASSAY OF BLOOD CHLORIDE: CPT

## 2019-11-12 PROCEDURE — 70450 CT HEAD/BRAIN W/O DYE: CPT

## 2019-11-12 PROCEDURE — 93005 ELECTROCARDIOGRAM TRACING: CPT

## 2019-11-12 PROCEDURE — 83735 ASSAY OF MAGNESIUM: CPT

## 2019-11-12 PROCEDURE — 86900 BLOOD TYPING SEROLOGIC ABO: CPT

## 2019-11-12 PROCEDURE — 71045 X-RAY EXAM CHEST 1 VIEW: CPT

## 2019-11-12 PROCEDURE — 70553 MRI BRAIN STEM W/O & W/DYE: CPT

## 2019-11-12 PROCEDURE — 87798 DETECT AGENT NOS DNA AMP: CPT

## 2019-11-12 PROCEDURE — A9585: CPT

## 2019-11-12 PROCEDURE — 83036 HEMOGLOBIN GLYCOSYLATED A1C: CPT

## 2019-11-12 PROCEDURE — 82565 ASSAY OF CREATININE: CPT

## 2019-11-12 PROCEDURE — 97110 THERAPEUTIC EXERCISES: CPT

## 2019-11-12 PROCEDURE — 96375 TX/PRO/DX INJ NEW DRUG ADDON: CPT | Mod: XU

## 2019-11-12 PROCEDURE — C8929: CPT

## 2019-11-12 PROCEDURE — 80164 ASSAY DIPROPYLACETIC ACD TOT: CPT

## 2019-11-12 PROCEDURE — 97116 GAIT TRAINING THERAPY: CPT

## 2019-11-12 PROCEDURE — 83605 ASSAY OF LACTIC ACID: CPT

## 2019-11-12 PROCEDURE — 80053 COMPREHEN METABOLIC PANEL: CPT

## 2019-11-12 PROCEDURE — 80048 BASIC METABOLIC PNL TOTAL CA: CPT

## 2019-11-12 PROCEDURE — 84132 ASSAY OF SERUM POTASSIUM: CPT

## 2019-11-12 PROCEDURE — 97162 PT EVAL MOD COMPLEX 30 MIN: CPT

## 2019-11-12 PROCEDURE — 96365 THER/PROPH/DIAG IV INF INIT: CPT | Mod: XU

## 2019-11-12 PROCEDURE — 87040 BLOOD CULTURE FOR BACTERIA: CPT

## 2021-02-09 NOTE — PATIENT PROFILE ADULT - NSPRONUTRITIONRISK_GEN_A_NUR
No indicators present Complex Repair And Graft Additional Text (Will Appearing After The Standard Complex Repair Text): The complex repair was not sufficient to completely close the primary defect. The remaining additional defect was repaired with the graft mentioned below.

## 2021-11-06 NOTE — ED ADULT NURSE NOTE - NSFALLRSKHARMRISK_ED_ALL_ED
Wound glue placed over laceration without complications.  Do not apply any topical ointments or medication on S.  Will follow up on its own.    Try to avoid getting area wet for extended periods of time.    Return if signs or symptoms of infection occur this includes redness, hot flash, purulent drainage, red streaking fevers or chills.  
no

## 2025-02-23 NOTE — CONSULT NOTE ADULT - CONSTITUTIONAL
Well-developed, well nourished Cardiac Monitor/Defib/ACLS/Rescue Kit/O2/BVM/oxygen/IV pump/pulse ox/ventilator/ACLS Rescue Kit